# Patient Record
Sex: FEMALE | Race: BLACK OR AFRICAN AMERICAN | Employment: UNEMPLOYED | URBAN - METROPOLITAN AREA
[De-identification: names, ages, dates, MRNs, and addresses within clinical notes are randomized per-mention and may not be internally consistent; named-entity substitution may affect disease eponyms.]

---

## 2020-11-06 DIAGNOSIS — Z12.31 SCREENING MAMMOGRAM, ENCOUNTER FOR: Primary | ICD-10-CM

## 2020-11-16 DIAGNOSIS — Z12.31 SCREENING MAMMOGRAM, ENCOUNTER FOR: ICD-10-CM

## 2020-11-16 PROCEDURE — 77067 SCR MAMMO BI INCL CAD: CPT | Performed by: OBSTETRICS & GYNECOLOGY

## 2020-12-17 DIAGNOSIS — N92.0 MENORRHAGIA WITH REGULAR CYCLE: Primary | ICD-10-CM

## 2020-12-17 RX ORDER — NORGESTIMATE AND ETHINYL ESTRADIOL 0.25-0.035
1 KIT ORAL DAILY
Qty: 1 DOSE PACK | Refills: 0 | Status: SHIPPED | OUTPATIENT
Start: 2020-12-17 | End: 2021-11-16

## 2021-09-01 VITALS — HEIGHT: 63 IN

## 2021-09-01 PROBLEM — E66.01 MORBID OBESITY (HCC): Status: ACTIVE | Noted: 2021-09-01

## 2021-09-27 PROBLEM — N13.39 OTHER HYDRONEPHROSIS: Status: ACTIVE | Noted: 2021-09-27

## 2021-09-29 ENCOUNTER — OFFICE VISIT (OUTPATIENT)
Dept: UROLOGY | Age: 41
End: 2021-09-29
Payer: COMMERCIAL

## 2021-09-29 VITALS
HEART RATE: 136 BPM | DIASTOLIC BLOOD PRESSURE: 72 MMHG | OXYGEN SATURATION: 99 % | TEMPERATURE: 97.8 F | HEIGHT: 64 IN | SYSTOLIC BLOOD PRESSURE: 97 MMHG | WEIGHT: 214 LBS | BODY MASS INDEX: 36.54 KG/M2

## 2021-09-29 DIAGNOSIS — C76.2 ABDOMINAL MALIGNANCY (HCC): ICD-10-CM

## 2021-09-29 DIAGNOSIS — C49.9 SARCOMA (HCC): ICD-10-CM

## 2021-09-29 DIAGNOSIS — N13.39 OTHER HYDRONEPHROSIS: ICD-10-CM

## 2021-09-29 LAB
BILIRUB UR QL STRIP: NORMAL
GLUCOSE UR-MCNC: NEGATIVE MG/DL
KETONES P FAST UR STRIP-MCNC: NEGATIVE MG/DL
PH UR STRIP: 5.5 [PH] (ref 4.6–8)
PROT UR QL STRIP: NORMAL
SP GR UR STRIP: 1.02 (ref 1–1.03)
UA UROBILINOGEN AMB POC: NORMAL (ref 0.2–1)
URINALYSIS CLARITY POC: CLEAR
URINALYSIS COLOR POC: YELLOW
URINE BLOOD POC: NORMAL
URINE LEUKOCYTES POC: NEGATIVE
URINE NITRITES POC: NEGATIVE

## 2021-09-29 PROCEDURE — 81003 URINALYSIS AUTO W/O SCOPE: CPT | Performed by: UROLOGY

## 2021-09-29 PROCEDURE — 99204 OFFICE O/P NEW MOD 45 MIN: CPT | Performed by: UROLOGY

## 2021-09-29 RX ORDER — ERGOCALCIFEROL 1.25 MG/1
50000 CAPSULE ORAL
COMMUNITY
End: 2022-04-13 | Stop reason: CLARIF

## 2021-09-29 RX ORDER — IBUPROFEN 800 MG/1
TABLET ORAL
COMMUNITY
End: 2022-04-13 | Stop reason: CLARIF

## 2021-09-29 RX ORDER — CYPROHEPTADINE HYDROCHLORIDE 4 MG/1
TABLET ORAL
COMMUNITY

## 2021-09-29 RX ORDER — METHOTREXATE 2.5 MG/1
TABLET ORAL
COMMUNITY
End: 2021-10-12 | Stop reason: ALTCHOICE

## 2021-09-29 RX ORDER — ONDANSETRON HYDROCHLORIDE 8 MG/1
8 TABLET, FILM COATED ORAL
COMMUNITY

## 2021-09-29 RX ORDER — PREDNISONE 5 MG/1
TABLET ORAL
COMMUNITY
End: 2021-11-16

## 2021-09-29 RX ORDER — METOPROLOL TARTRATE 25 MG/1
TABLET, FILM COATED ORAL 2 TIMES DAILY
COMMUNITY

## 2021-09-29 RX ORDER — LANOLIN ALCOHOL/MO/W.PET/CERES
CREAM (GRAM) TOPICAL
COMMUNITY
End: 2022-04-13 | Stop reason: CLARIF

## 2021-09-29 RX ORDER — HYDROXYCHLOROQUINE SULFATE 200 MG/1
200 TABLET, FILM COATED ORAL DAILY
COMMUNITY
End: 2021-10-12 | Stop reason: ALTCHOICE

## 2021-09-29 RX ORDER — CYCLOBENZAPRINE HCL 10 MG
TABLET ORAL
COMMUNITY

## 2021-09-29 RX ORDER — FOLIC ACID 1 MG/1
TABLET ORAL DAILY
COMMUNITY

## 2021-09-29 RX ORDER — ROSUVASTATIN CALCIUM 10 MG/1
10 TABLET, COATED ORAL
COMMUNITY

## 2021-09-29 NOTE — PROGRESS NOTES
HISTORY OF PRESENT ILLNESS  Dwayne Barahona is a 35 y.o. female. Chief Complaint   Patient presents with    New Patient    Hydronephrosis     She is referred by Dr. Manav Orozco for hydronephrosis. She is a 38-year-old woman with rheumatoid arthritis. She has an anemia associated with methotrexate. She saw hematology for this. A CT scan was obtained 9/2/2021. She had multiple findings. There was a large central heterogeneous mass 24 x 17 x 13.6 cm, displacing multiple adjacent organs and causing right-sided hydronephrosis and hydroureter. Origin was not definite. There were multiple metastatic foci in the liver and spleen. There were obturator and inguinal lymph nodes identified. There was a 3.2 cm right ovarian mass. There were 3.6 x 3.2 cm left sacral bony lesions representing possible metastatic foci. There was associated pathologic fracture through the periosteum visualized. Chest CT revealed multiple nodules in lung, the largest being 11 mm in size. Her creatinine was reportedly normal.    She apparently had a IR guided biopsy ordered. 9/9/21 she had right eye problems. She was admitted for evaluation. MRI of the brain was clear. She had biopsy of her abdominal mass and was found to have sarcoma. She was discharged 9/19/21. She has right sided flank pain since her lumbar puncture while in the hospital.    She is voiding 4-5x per night. She does drink each time she voids. Her daytime frequency is reasonable. She does not have urgency. Chronic Conditions Addressed Today     1. Other hydronephrosis     Overview      Referred from [facility] for abdominal mass that is displacing adjacent organs and causing right-sided hydronephrosis and hydroureter on CT 9/2/21. Imaging not available. 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   Renal function is preserved and she is now symptomatic from the hydronephrosis. Due to the extent of the abdominal mass she needs a ureteral stent, especially if she will get nephrotoxic medications. We will schedule her tomorrow if it can be arranged          Relevant Medications     ergocalciferol (Vitamin D2) 1,250 mcg (50,000 unit) capsule     ferrous sulfate (Iron) 325 mg (65 mg iron) tablet     predniSONE (DELTASONE) 5 mg tablet    2. Abdominal malignancy ([facility] [address]     Current Assessment & Plan      She has a large abdominal mass with probable metastasis to sacrum, liver and lung. IR guided biopsy is planned. Relevant Medications     methotrexate (RHEUMATREX) 2.5 mg tablet     predniSONE (DELTASONE) 5 mg tablet     ondansetron hcl (Zofran) 8 mg tablet    3. Sarcoma [facility])     Current Assessment & Plan      She has stage IV sarcoma. She will discuss treatment with her oncologist after her PET scan          Relevant Medications     methotrexate (RHEUMATREX) 2.5 mg tablet     predniSONE (DELTASONE) 5 mg tablet     ondansetron hcl (Zofran) 8 mg tablet          Past Medical History:    PMHx (including negatives):  has a past medical history of Cancer ([facility] [address] and Morbid obesity ([facility] [address] (9/1/2021). PSurgHx:  has a past surgical history that includes hx wisdom teeth extraction. PSocHx:  reports that she has never smoked. She has never used smokeless tobacco. She reports previous alcohol use. She reports that she does not use drugs. Review of Systems   Constitutional: Positive for weight loss (34lbs). Eyes: Positive for double vision (wears an eye patch). Respiratory: Positive for shortness of breath (asthma). Gastrointestinal: Positive for nausea (occasional). Negative for constipation and diarrhea. Genitourinary: Negative for dysuria, frequency, hematuria and urgency. Musculoskeletal: Positive for back pain. Neurological: Negative for tingling and tremors. All other systems reviewed and are negative. Physical Exam  Vitals reviewed. Constitutional:       General: She is not in acute distress. Appearance: Normal appearance. She is obese. She is not ill-appearing, toxic-appearing or diaphoretic. HENT:      Head: Normocephalic and atraumatic. Mouth/Throat:      Mouth: Mucous membranes are moist.      Pharynx: Oropharynx is clear. Eyes:      Conjunctiva/sclera: Conjunctivae normal.      Comments: Right eye lateral displacement, weak movement   Cardiovascular:      Rate and Rhythm: Normal rate and regular rhythm. Pulmonary:      Effort: Pulmonary effort is normal. No respiratory distress. Breath sounds: Normal breath sounds. Abdominal:      General: Bowel sounds are normal.      Palpations: Abdomen is soft. There is mass (large right abdominal mass). Musculoskeletal:         General: No swelling or deformity. Normal range of motion. Cervical back: Normal range of motion. Lymphadenopathy:      Cervical: No cervical adenopathy. Upper Body:      Right upper body: No supraclavicular adenopathy. Left upper body: No supraclavicular adenopathy. Skin:     General: Skin is warm and dry. Neurological:      General: No focal deficit present. Mental Status: She is alert and oriented to person, place, and time. Psychiatric:         Mood and Affect: Mood normal.         Behavior: Behavior normal.       Allergies   Allergen Reactions    Phenergan [Promethazine] Nausea Only    Toradol [Ketorolac] Rash     nausea    Vicodin [Hydrocodone-Acetaminophen] Rash     nausea      Prior to Admission medications    Medication Sig Start Date End Date Taking? Authorizing Provider   folic acid (FOLVITE) 1 mg tablet Take  by mouth daily. Yes Provider, Historical   methotrexate (RHEUMATREX) 2.5 mg tablet Take  by mouth. Yes Provider, Historical   rosuvastatin (CRESTOR) 10 mg tablet Take 10 mg by mouth nightly.    Yes Provider, Historical   ergocalciferol (Vitamin D2) 1,250 mcg (50,000 unit) capsule Take 50,000 Units by mouth.   Yes Provider, Historical   hydrOXYchloroQUINE (PLAQUENIL) 200 mg tablet Take 200 mg by mouth daily. Yes Provider, Historical   cyclobenzaprine (FLEXERIL) 10 mg tablet Take  by mouth three (3) times daily as needed for Muscle Spasm(s). Yes Provider, Historical   ibuprofen (MOTRIN) 800 mg tablet Take  by mouth. Yes Provider, Historical   ferrous sulfate (Iron) 325 mg (65 mg iron) tablet Take  by mouth Daily (before breakfast). Yes Provider, Historical   multivitamin, tx-iron-ca-min (THERA-M w/ IRON) 9 mg iron-400 mcg tab tablet Take 1 Tablet by mouth daily. Yes Provider, Historical   predniSONE (DELTASONE) 5 mg tablet Take  by mouth. Yes Provider, Historical   metoprolol tartrate (LOPRESSOR) 25 mg tablet Take  by mouth two (2) times a day. Yes Provider, Historical   ondansetron hcl (Zofran) 8 mg tablet Take 8 mg by mouth every eight (8) hours as needed for Nausea or Vomiting. Yes Provider, Historical   cyproheptadine (PERIACTIN) 4 mg tablet Take  by mouth three (3) times daily as needed. Yes Provider, Historical   norgestimate-ethinyl estradioL (Sprintec, 28,) 0.25-35 mg-mcg tab Take 1 Tab by mouth daily. 12/17/20  Yes Ebony Quach MD        ASSESSMENT and PLAN  Diagnoses and all orders for this visit:    1. 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 Renal function is preserved and she is now symptomatic from the hydronephrosis. Due to the extent of the abdominal mass she needs a ureteral stent, especially if she will get nephrotoxic medications. We will schedule her tomorrow if it can be arranged      2. Abdominal malignancy ([address]  Assessment & Plan:  She has a large abdominal mass with probable metastasis to sacrum, liver and lung. IR guided biopsy is planned. 3. Sarcoma [facility])  Assessment & Plan:  She has stage IV sarcoma.   She will discuss treatment with her oncologist after her PET scan      Other orders  -     AMB POC URINALYSIS DIP STICK AUTO W/O MICRO         [facility], MD

## 2021-09-29 NOTE — PROGRESS NOTES
Chief Complaint   Patient presents with    New Patient    Hydronephrosis     1. Have you been to the ER, urgent care clinic since your last visit? Hospitalized since your last visit? Yes When: 09/09/2021  Where: [facility]    2. Have you seen or consulted any other health care providers outside of the [address] [place] [address] since your last visit? Include any pap smears or colon screening.  No   Visit Vitals  BP 97/72 (BP 1 Location: Right upper arm, BP Patient Position: Sitting, BP Cuff Size: Adult)   Pulse (!) 136   Temp 97.8 °F (36.6 °C) (Temporal)   Ht 5' 4\" (1.626 m)   Wt 214 lb (97.1 kg)   SpO2 99%   BMI 36.73 kg/m²

## 2021-09-29 NOTE — ASSESSMENT & PLAN NOTE
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 Renal function is preserved and she is now symptomatic from the hydronephrosis. Due to the extent of the abdominal mass she needs a ureteral stent, especially if she will get nephrotoxic medications.     We will schedule her tomorrow if it can be arranged

## 2021-09-29 NOTE — ASSESSMENT & PLAN NOTE
She has a large abdominal mass with probable metastasis to sacrum, liver and lung. IR guided biopsy is planned.

## 2021-09-29 NOTE — LETTER
9/86/[id number]    Patient: Magdy Jasso   YOB: 1980   Date of Visit: 9/29/2021     Solomon Clayton MD  [address]  [address] [id number]  Via Fax: [phone]     Robyn Ureña MD  [address] [facility] [address]  Via Fax: [phone]    Dear MD Robyn Walter MD,      Thank you for referring Ms. Car Mail to [facility] for evaluation. My notes for this consultation are attached. If you have questions, please do not hesitate to call me. I look forward to following your patient along with you.       Sincerely,    Layton Rubinstein, MD

## 2021-09-30 ENCOUNTER — OFFICE VISIT (OUTPATIENT)
Dept: UROLOGY | Age: 41
End: 2021-09-30

## 2021-10-05 PROBLEM — Z96.0 RETAINED URETERAL STENT: Status: ACTIVE | Noted: 2021-10-05

## 2021-10-05 NOTE — PROGRESS NOTES
HISTORY OF PRESENT ILLNESS  Kaela Hinson is a 35 y.o. female. Chief Complaint   Patient presents with    Follow-up    Hydronephrosis     She has metastatic abdominal sarcoma with right-sided hydronephrosis secondary to the abdominal mass. She is now status post cystoscopy, bilateral RPG, right ureteral stent placement on 9/30/2021. The right ureter had a serpentine course with marked medial deviation. The proximal ureter was S-shaped with moderate hydronephrosis and blunting. Due to the deviated course the ureter appeared very stretched out and greater than 28 cm from UPJ to ureteral orifice. She is here today in follow-up. She is not bothered by her stent. She did not sense it versus everything else going on in her stomach. She states she is to start chemotherapy    Chronic Conditions Addressed Today     1. Other hydronephrosis     Overview      Referred from [facility] for abdominal mass that is displacing adjacent organs and causing right-sided hydronephrosis and hydroureter on CT 9/2/21. Imaging not available. Creatinine 8/27/21 was 0.72    9/30/21: s/p cystoscopy, bilateral RPG, right ureteral stent placement. Normal left RPG. Ureter had a serpentine course with marked medial deviation on the right side. The proximal ureter was S-shaped and there was moderate hydronephrosis and blunting. Due to the deviated course the ureter appeared very stretched out and greater than 28 cm from the UPJ to the ureteral orifice. Current Assessment & Plan       She has right hydronephrosis secondary to extrinsic compression from a large abdominal sarcoma. She is status post ureteral stenting on 9/30/2021. We will plan on monitor renal function and follow-up in 6 months for consideration of stent change. Relevant Medications     magnesium 250 mg tab     Other Relevant Orders     METABOLIC PANEL, BASIC    2.  Sarcoma [facility])     Current Assessment & Plan      Unfortunately she has a large abdominal sarcoma, metastatic to multiple sites. Overall prognosis is poor. 3. Retained ureteral stent - Primary     Overview      She is status post cystoscopy, bilateral RPG, and right ureteral stent placement on 9/30/2021. Current Assessment & Plan      She is status post a right ureteral stent. Hopefully this will be renal protective if she faces nephrotoxic chemotherapy. Patient denies the symptoms of COVID-19 per routine screening guidelines. ROS    Past Medical History:  PMHx (including negatives):  has a past medical history of Cancer ([address] Hypercholesterolemia, and Morbid obesity ([address] (9/1/2021). PSurgHx:  has a past surgical history that includes hx wisdom teeth extraction; hx urological (09/30/2021); hx urological (Bilateral, 09/30/2021); and hx urological (Right, 09/30/2021). PSocHx:  reports that she has never smoked. She has never used smokeless tobacco. She reports previous alcohol use. She reports that she does not use drugs. Physical Exam    ASSESSMENT and PLAN  Diagnoses and all orders for this visit:    1. Retained ureteral stent  Assessment & Plan:  She is status post a right ureteral stent. Hopefully this will be renal protective if she faces nephrotoxic chemotherapy. 2. Other hydronephrosis  Assessment & Plan:   She has right hydronephrosis secondary to extrinsic compression from a large abdominal sarcoma. She is status post ureteral stenting on 9/30/2021. We will plan on monitor renal function and follow-up in 6 months for consideration of stent change. Orders:  -     METABOLIC PANEL, BASIC; Future    3. Sarcoma [facility])  Assessment & Plan:  Unfortunately she has a large abdominal sarcoma, metastatic to multiple sites. Overall prognosis is poor. Follow-up and Dispositions    · Return in about 6 months (around 4/12/2022).          Deanna Roman MD

## 2021-10-12 ENCOUNTER — OFFICE VISIT (OUTPATIENT)
Dept: UROLOGY | Age: 41
End: 2021-10-12
Payer: COMMERCIAL

## 2021-10-12 VITALS — BODY MASS INDEX: 33.8 KG/M2 | HEIGHT: 64 IN | WEIGHT: 198 LBS

## 2021-10-12 DIAGNOSIS — Z96.0 RETAINED URETERAL STENT: Primary | ICD-10-CM

## 2021-10-12 DIAGNOSIS — N13.39 OTHER HYDRONEPHROSIS: ICD-10-CM

## 2021-10-12 DIAGNOSIS — C49.9 SARCOMA (HCC): ICD-10-CM

## 2021-10-12 PROCEDURE — 99213 OFFICE O/P EST LOW 20 MIN: CPT | Performed by: UROLOGY

## 2021-10-12 RX ORDER — ZINC GLUCONATE 10 MG
LOZENGE ORAL
COMMUNITY
End: 2022-04-13 | Stop reason: CLARIF

## 2021-10-12 NOTE — PROGRESS NOTES
Chief Complaint   Patient presents with    Follow-up    Hydronephrosis     1. Have you been to the ER, urgent care clinic since your last visit? Hospitalized since your last visit? Yes Where: [facility]    2. Have you seen or consulted any other health care providers outside of the [address] [place] [address] since your last visit? Include any pap smears or colon screening.  No  Visit Vitals  Ht 5' 4\" (1.626 m)   Wt 198 lb (89.8 kg)   BMI 33.99 kg/m²

## 2021-10-12 NOTE — ASSESSMENT & PLAN NOTE
She has right hydronephrosis secondary to extrinsic compression from a large abdominal sarcoma. She is status post ureteral stenting on 9/30/2021. We will plan on monitor renal function and follow-up in 6 months for consideration of stent change.

## 2021-10-12 NOTE — LETTER
18/34/6587    Patient: Frances Neumann   YOB: 1980   Date of Visit: 10/12/2021     Shar Romero MD  [address]  [address] [address]  Via Fax: [phone]    Dear Shar Romero MD,      Thank you for referring Ms. Lisa Gustafson to [facility] for evaluation. My notes for this consultation are attached. If you have questions, please do not hesitate to call me. I look forward to following your patient along with you.       Sincerely,    Wendy Sierra MD

## 2021-10-12 NOTE — ASSESSMENT & PLAN NOTE
She is status post a right ureteral stent. Hopefully this will be renal protective if she faces nephrotoxic chemotherapy.

## 2021-10-12 NOTE — ASSESSMENT & PLAN NOTE
Unfortunately she has a large abdominal sarcoma, metastatic to multiple sites. Overall prognosis is poor.

## 2021-11-12 VITALS — HEIGHT: 63 IN | BODY MASS INDEX: 35.07 KG/M2

## 2021-11-16 ENCOUNTER — OFFICE VISIT (OUTPATIENT)
Dept: OBGYN CLINIC | Age: 41
End: 2021-11-16
Payer: COMMERCIAL

## 2021-11-16 VITALS
DIASTOLIC BLOOD PRESSURE: 74 MMHG | WEIGHT: 201.13 LBS | HEIGHT: 63 IN | SYSTOLIC BLOOD PRESSURE: 126 MMHG | BODY MASS INDEX: 35.64 KG/M2

## 2021-11-16 DIAGNOSIS — N76.6 VULVAR ULCERATION: ICD-10-CM

## 2021-11-16 DIAGNOSIS — Z12.4 SCREENING FOR MALIGNANT NEOPLASM OF CERVIX: Primary | ICD-10-CM

## 2021-11-16 DIAGNOSIS — Z01.419 ROUTINE GYNECOLOGICAL EXAMINATION: ICD-10-CM

## 2021-11-16 PROCEDURE — 99396 PREV VISIT EST AGE 40-64: CPT | Performed by: OBSTETRICS & GYNECOLOGY

## 2021-11-16 RX ORDER — VALACYCLOVIR HYDROCHLORIDE 500 MG/1
500 TABLET, FILM COATED ORAL 2 TIMES DAILY
Qty: 20 TABLET | Refills: 2 | Status: SHIPPED | OUTPATIENT
Start: 2021-11-16 | End: 2021-12-08

## 2021-11-16 RX ORDER — SENNOSIDES 25 MG/1
TABLET, FILM COATED ORAL
Qty: 45 G | Refills: 1 | Status: SHIPPED | OUTPATIENT
Start: 2021-11-16 | End: 2022-10-08

## 2021-11-16 NOTE — PROGRESS NOTES
Chief Complaint   Patient presents with    Annual Exam     Visit Vitals  /74 (BP 1 Location: Right arm, BP Patient Position: Sitting, BP Cuff Size: Small adult)   Ht 5' 3\" (1.6 m)   Wt 201 lb 2 oz (91.2 kg)   BMI 35.63 kg/m²

## 2021-11-16 NOTE — PROGRESS NOTES
Vipul John is a , 35 y.o. female   No LMP recorded. (Menstrual status: Other (see Comment)). She presents for her annual    She is having significant dx'ed with abdominal sarcoma- under going therapy- chemo for now, notes some dysuria recently. Menstrual status:  Cycles are menopausal.    Flow: absent. She does not have dysmenorrhea. Medical conditions:  Since her last annual GYN exam about ? years ago, she has not the following changes in her health history: none. Mammogram History:    CHRIS Results (most recent):  Results from Orders Only encounter on 20    CHRIS MAMMO BI SCREENING INCL CAD       DEXA Results (most recent):  No results found for this or any previous visit. Past Medical History:   Diagnosis Date    Cancer ([facility] [address]     carcoma     Hypercholesterolemia     Irregular periods     Morbid obesity ([facility] [address] 2021    Primary intra-abdominal sarcoma ([facility] [address] 2021     Past Surgical History:   Procedure Laterality Date    HX UROLOGICAL  2021    CYSTOSCOPY     HX UROLOGICAL Bilateral 2021    RETROGRADE PYELOGRAMS    HX UROLOGICAL Right 2021    URETERAL STENT PLACEMENT     HX WISDOM TEETH EXTRACTION         Prior to Admission medications    Medication Sig Start Date End Date Taking? Authorizing Provider   lidocaine 5 % topical cream Apply  to affected area two (2) times daily as needed for Pain. 21  Yes Irlanda Alicia MD   valACYclovir (VALTREX) 500 mg tablet Take 1 Tablet by mouth two (2) times a day for 30 days. 21 Yes Irlanda Alicia MD   magnesium 250 mg tab Take  by mouth. Yes Provider, Historical   folic acid (FOLVITE) 1 mg tablet Take  by mouth daily. Yes Provider, Historical   rosuvastatin (CRESTOR) 10 mg tablet Take 10 mg by mouth nightly. Yes Provider, Historical   ergocalciferol (Vitamin D2) 1,250 mcg (50,000 unit) capsule Take 50,000 Units by mouth.    Yes Provider, Historical   cyclobenzaprine (FLEXERIL) 10 mg tablet Take  by mouth three (3) times daily as needed for Muscle Spasm(s). Yes Provider, Historical   ibuprofen (MOTRIN) 800 mg tablet Take  by mouth. Yes Provider, Historical   ferrous sulfate (Iron) 325 mg (65 mg iron) tablet Take  by mouth Daily (before breakfast). Yes Provider, Historical   multivitamin, tx-iron-ca-min (THERA-M w/ IRON) 9 mg iron-400 mcg tab tablet Take 1 Tablet by mouth daily. Yes Provider, Historical   metoprolol tartrate (LOPRESSOR) 25 mg tablet Take  by mouth two (2) times a day. Yes Provider, Historical   ondansetron hcl (Zofran) 8 mg tablet Take 8 mg by mouth every eight (8) hours as needed for Nausea or Vomiting. Yes Provider, Historical   cyproheptadine (PERIACTIN) 4 mg tablet Take  by mouth three (3) times daily as needed. Yes Provider, Historical       Allergies   Allergen Reactions    Phenergan [Promethazine] Nausea Only    Toradol [Ketorolac] Rash     nausea    Vicodin [Hydrocodone-Acetaminophen] Rash     nausea          Tobacco History:  reports that she has never smoked. She has never used smokeless tobacco.  Alcohol Abuse:  reports previous alcohol use. Drug Abuse:  reports no history of drug use. Family Medical/Cancer History:   Family History   Problem Relation Age of Onset    Hypertension Mother     Other Mother         uterine fibroids    Heart Disease Father     Hypertension Father           Review of Systems   Constitutional: Negative for chills, fever, malaise/fatigue and weight loss. HENT: Negative for congestion, ear pain, sinus pain and tinnitus. Eyes: Negative for blurred vision and double vision. Respiratory: Negative for cough, shortness of breath and wheezing. Cardiovascular: Negative for chest pain and palpitations. Gastrointestinal: Negative for abdominal pain, blood in stool, constipation, diarrhea, heartburn, nausea and vomiting.    Genitourinary: Negative for dysuria, flank pain, frequency, hematuria and urgency. Musculoskeletal: Negative for joint pain and myalgias. Skin: Negative for itching and rash. Neurological: Negative for dizziness, weakness and headaches. Psychiatric/Behavioral: Negative for depression, memory loss and suicidal ideas. The patient is not nervous/anxious and does not have insomnia. Physical Exam  Constitutional:       Appearance: Normal appearance. HENT:      Head: Normocephalic and atraumatic. Cardiovascular:      Rate and Rhythm: Normal rate. Heart sounds: Normal heart sounds. Pulmonary:      Effort: Pulmonary effort is normal.      Breath sounds: Normal breath sounds. Chest:   Breasts:      Right: Normal.      Left: Normal.       Abdominal:      General: Abdomen is flat. Palpations: Abdomen is soft. Genitourinary:     General: Normal vulva. Vagina: Normal.      Cervix: Normal.      Uterus: Normal.       Adnexa: Right adnexa normal and left adnexa normal.      Rectum: Normal.          Comments: PAP Obtained  Neurological:      Mental Status: She is alert. Psychiatric:         Mood and Affect: Mood normal.         Behavior: Behavior normal.         Thought Content: Thought content normal.          Visit Vitals  /74 (BP 1 Location: Right arm, BP Patient Position: Sitting, BP Cuff Size: Small adult)   Ht 5' 3\" (1.6 m)   Wt 201 lb 2 oz (91.2 kg)   BMI 35.63 kg/m²         Assessment:   Diagnoses and all orders for this visit:    1. Screening for malignant neoplasm of cervix  -     PAP IG, RFX APTIMA HPV ASCUS ([id number])    2. Routine gynecological examination  -     PAP IG, RFX APTIMA HPV ASCUS ([id number])    3. Vulvar ulceration    Other orders  -     lidocaine 5 % topical cream; Apply  to affected area two (2) times daily as needed for Pain.  -     valACYclovir (VALTREX) 500 mg tablet; Take 1 Tablet by mouth two (2) times a day for 30 days.     Due for COVID booster- will get Mammo after the holidays    Plan: Questions addressed  Counseled re: diet, exercise, healthy lifestyle  Return for Annual  Rec annual mammogram        Follow-up and Dispositions    · Return for Mammogram, 1 yr annual, 1 yr mammo.

## 2021-11-29 LAB
CYTOLOGIST CVX/VAG CYTO: NORMAL
CYTOLOGY CVX/VAG DOC CYTO: NORMAL
CYTOLOGY CVX/VAG DOC THIN PREP: NORMAL
DX ICD CODE: NORMAL
LABCORP, 190119: NORMAL
Lab: NORMAL
OTHER STN SPEC: NORMAL
STAT OF ADQ CVX/VAG CYTO-IMP: NORMAL

## 2021-12-08 RX ORDER — VALACYCLOVIR HYDROCHLORIDE 500 MG/1
500 TABLET, FILM COATED ORAL 2 TIMES DAILY
Qty: 60 TABLET | Refills: 0 | Status: SHIPPED | OUTPATIENT
Start: 2021-12-08 | End: 2022-01-03

## 2022-01-03 RX ORDER — VALACYCLOVIR HYDROCHLORIDE 500 MG/1
500 TABLET, FILM COATED ORAL 2 TIMES DAILY
Qty: 60 TABLET | Refills: 0 | Status: SHIPPED | OUTPATIENT
Start: 2022-01-03 | End: 2022-02-02

## 2022-01-12 DIAGNOSIS — N13.39 OTHER HYDRONEPHROSIS: ICD-10-CM

## 2022-03-19 PROBLEM — E66.01 MORBID OBESITY (HCC): Status: ACTIVE | Noted: 2021-09-01

## 2022-03-19 PROBLEM — N76.6 VULVAR ULCERATION: Status: ACTIVE | Noted: 2021-11-16

## 2022-03-19 PROBLEM — N13.39 OTHER HYDRONEPHROSIS: Status: ACTIVE | Noted: 2021-09-27

## 2022-03-19 PROBLEM — Z96.0 RETAINED URETERAL STENT: Status: ACTIVE | Noted: 2021-10-05

## 2022-03-19 PROBLEM — C49.9 SARCOMA (HCC): Status: ACTIVE | Noted: 2021-09-29

## 2022-03-20 PROBLEM — C76.2 ABDOMINAL MALIGNANCY (HCC): Status: ACTIVE | Noted: 2021-09-29

## 2022-03-30 ENCOUNTER — TELEPHONE (OUTPATIENT)
Dept: OBGYN CLINIC | Age: 42
End: 2022-03-30

## 2022-03-30 DIAGNOSIS — A60.00 GENITAL HERPES SIMPLEX, UNSPECIFIED SITE: Primary | ICD-10-CM

## 2022-03-30 RX ORDER — VALACYCLOVIR HYDROCHLORIDE 500 MG/1
500 TABLET, FILM COATED ORAL 2 TIMES DAILY
Qty: 60 TABLET | Refills: 3 | Status: SHIPPED | OUTPATIENT
Start: 2022-03-30 | End: 2022-08-29

## 2022-04-12 ENCOUNTER — OFFICE VISIT (OUTPATIENT)
Dept: UROLOGY | Age: 42
End: 2022-04-12
Payer: COMMERCIAL

## 2022-04-12 VITALS
SYSTOLIC BLOOD PRESSURE: 102 MMHG | HEART RATE: 111 BPM | TEMPERATURE: 98.6 F | HEIGHT: 63 IN | WEIGHT: 227 LBS | BODY MASS INDEX: 40.22 KG/M2 | OXYGEN SATURATION: 99 % | DIASTOLIC BLOOD PRESSURE: 69 MMHG

## 2022-04-12 DIAGNOSIS — C49.9 SARCOMA (HCC): ICD-10-CM

## 2022-04-12 DIAGNOSIS — Z96.0 RETAINED URETERAL STENT: ICD-10-CM

## 2022-04-12 DIAGNOSIS — R82.81 PYURIA: ICD-10-CM

## 2022-04-12 DIAGNOSIS — N13.39 OTHER HYDRONEPHROSIS: Primary | ICD-10-CM

## 2022-04-12 DIAGNOSIS — C76.2 ABDOMINAL MALIGNANCY (HCC): ICD-10-CM

## 2022-04-12 LAB
BILIRUB UR QL: NEGATIVE
GLUCOSE UR-MCNC: NEGATIVE MG/DL
KETONES P FAST UR STRIP-MCNC: NEGATIVE MG/DL
PH UR STRIP: 7 [PH] (ref 4.6–8)
PROT UR QL STRIP: 30
SP GR UR STRIP: 1.01 (ref 1–1.03)
UA UROBILINOGEN AMB POC: NORMAL (ref 0.2–1)
URINALYSIS CLARITY POC: CLEAR
URINALYSIS COLOR POC: YELLOW
URINE BLOOD POC: NORMAL
URINE LEUKOCYTES POC: NORMAL
URINE NITRITES POC: NEGATIVE

## 2022-04-12 PROCEDURE — 81003 URINALYSIS AUTO W/O SCOPE: CPT | Performed by: UROLOGY

## 2022-04-12 PROCEDURE — 99214 OFFICE O/P EST MOD 30 MIN: CPT | Performed by: UROLOGY

## 2022-04-12 NOTE — PROGRESS NOTES
HISTORY OF PRESENT ILLNESS  Huy Sheth is a 38 y.o. female. Chief Complaint   Patient presents with    Follow-up    Hydronephrosis     Past Medical History:  PMHx (including negatives):  has a past medical history of Cancer ([address] Hypercholesterolemia, Irregular periods, Morbid obesity ([address] (9/1/2021), and Primary intra-abdominal sarcoma ([facility] [address] (08/01/2021). PSurgHx:  has a past surgical history that includes hx wisdom teeth extraction; hx urological (09/30/2021); hx urological (Bilateral, 09/30/2021); and hx urological (Right, 09/30/2021). PSocHx:  reports that she has never smoked. She has never used smokeless tobacco. She reports previous alcohol use. She reports that she does not use drugs. She is s/p ureteral stent placement on 9/30/2021 secondary to hydronephrosis (from abdominal mass/sarcoma). She is here today to discuss stent exchange. She feels improved. She is on chemotherapy. Her tumor has regressed. Chronic Conditions Addressed Today     1. Other hydronephrosis - Primary     Overview      Referred from I for abdominal mass that is displacing adjacent organs and causing right-sided hydronephrosis and hydroureter on CT 9/2/21. Imaging not available. Creatinine 8/27/21 was 0.72    9/30/21: s/p cystoscopy, bilateral RPG, right ureteral stent placement. Normal left RPG. Ureter had a serpentine course with marked medial deviation on the right side. The proximal ureter was S-shaped and there was moderate hydronephrosis and blunting. Due to the deviated course the ureter appeared very stretched out and greater than 28 cm from the UPJ to the ureteral orifice. 2. 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 Overall, prognosis is poor. 3. Retained ureteral stent     Overview      She is status post cystoscopy, bilateral RPG, and right ureteral stent placement on 9/30/2021.                     Review of Systems All other systems reviewed and are negative. Patient denies the symptoms of COVID-19 per routine screening guidelines. Physical Exam  Vitals reviewed. Constitutional:       General: She is not in acute distress. Appearance: Normal appearance. She is obese. She is not ill-appearing, toxic-appearing or diaphoretic. HENT:      Head: Normocephalic and atraumatic. Nose: Nose normal.   Eyes:      Conjunctiva/sclera: Conjunctivae normal.      Pupils: Pupils are equal, round, and reactive to light. Cardiovascular:      Rate and Rhythm: Normal rate and regular rhythm. Pulmonary:      Effort: Pulmonary effort is normal. No respiratory distress. Breath sounds: Normal breath sounds. Musculoskeletal:      Cervical back: Normal range of motion. Neurological:      General: No focal deficit present. Mental Status: She is alert and oriented to person, place, and time. Psychiatric:         Mood and Affect: Mood normal.         ASSESSMENT and PLAN  Diagnoses and all orders for this visit:    1. Other hydronephrosis  Assessment & Plan:   Bilateral ureteral obstruction secondary to sarcoma. Her stents had been in over 6 months. She is due for a change. We will schedule this in the future    Orders:  -     AMB POC URINALYSIS DIP STICK AUTO W/O MICRO    2. Retained ureteral stent  Assessment & Plan:  Ureteral stents were placed September 30, 2021. She is due for a change of    Orders:  -     AMB POC URINALYSIS DIP STICK AUTO W/O MICRO    3. Sarcoma ([address]    4. Abdominal malignancy [facility])  Assessment & Plan:  She has metastatic sarcoma. Guarded overall prognosis      5. Pyuria  -     CULTURE, URINE  -     URINALYSIS W/MICROSCOPIC         Follow-up and Dispositions    · Return for Surgery to be scheduled. Laurie Kidd may have a reminder for a \"due or due soon\" health maintenance.  The patient has been encouraged to contact their primary care provider for follow-up on this health maintenance or other necessary and/or routine health screening.      Van Villagran MD

## 2022-04-12 NOTE — PROGRESS NOTES
Chief Complaint   Patient presents with    Follow-up    Hydronephrosis       PHQ-9 score is    Negative    Vitals:    04/12/22 1309   BP: 102/69   Pulse: (!) 111   Temp: 98.6 °F (37 °C)   TempSrc: Temporal   SpO2: 99%   Weight: 227 lb (103 kg)   Height: 5' 3\" (1.6 m)   PainSc:   0 - No pain      1. \"Have you been to the ER, urgent care clinic since your last visit? Hospitalized since your last visit? \" No    2. \"Have you seen or consulted any other health care providers outside of the [address] [place] [address] since your last visit? \" No     3. For patients aged 39-70: Has the patient had a colonoscopy / FIT/ Cologuard? No      If the patient is female:    4. For patients aged 41-77: Has the patient had a mammogram within the past 2 years? No      5. For patients aged 21-65: Has the patient had a pap smear?  No

## 2022-04-12 NOTE — ASSESSMENT & PLAN NOTE
Bilateral ureteral obstruction secondary to sarcoma. Her stents had been in over 6 months. She is due for a change.   We will schedule this in the future

## 2022-04-12 NOTE — LETTER
7/75/5376    Patient: Wolfgang Garnica   YOB: 1980   Date of Visit: 4/12/2022     Tere Lewis MD  [address]  [address] [facility] [address]  Via Fax: [phone]     Augusta Davis MD  [facility] [place] [address]  Via Fax: [phone]    Dear MD Augusta Morton MD,      Thank you for referring Ms. Shruthi Dunlap to [place] [address] for evaluation. My notes for this consultation are attached. If you have questions, please do not hesitate to call me. I look forward to following your patient along with you.       Sincerely,    Yao Torres MD

## 2022-04-13 RX ORDER — CALCIUM CARBONATE 600 MG
1 TABLET ORAL DAILY
COMMUNITY
Start: 2022-03-24 | End: 2022-06-22

## 2022-04-13 RX ORDER — FUROSEMIDE 40 MG/1
TABLET ORAL
COMMUNITY
Start: 2022-04-07

## 2022-04-13 RX ORDER — ALBUTEROL SULFATE 90 UG/1
AEROSOL, METERED RESPIRATORY (INHALATION)
COMMUNITY
Start: 2022-03-31

## 2022-04-15 LAB
APPEARANCE UR: CLEAR
BACTERIA #/AREA URNS HPF: ABNORMAL /[HPF]
BACTERIA UR CULT: NORMAL
BILIRUB UR QL STRIP: NEGATIVE
CASTS URNS QL MICRO: ABNORMAL /LPF
COLOR UR: YELLOW
EPI CELLS #/AREA URNS HPF: ABNORMAL /HPF (ref 0–10)
GLUCOSE UR QL STRIP: NEGATIVE
HGB UR QL STRIP: ABNORMAL
KETONES UR QL STRIP: NEGATIVE
LEUKOCYTE ESTERASE UR QL STRIP: ABNORMAL
MICRO URNS: ABNORMAL
NITRITE UR QL STRIP: NEGATIVE
PH UR STRIP: 7.5 [PH] (ref 5–7.5)
PROT UR QL STRIP: ABNORMAL
RBC #/AREA URNS HPF: ABNORMAL /HPF (ref 0–2)
SP GR UR STRIP: 1.02 (ref 1–1.03)
UROBILINOGEN UR STRIP-MCNC: 1 MG/DL (ref 0.2–1)
WBC #/AREA URNS HPF: ABNORMAL /HPF (ref 0–5)

## 2022-04-21 ENCOUNTER — ANESTHESIA EVENT (OUTPATIENT)
Dept: SURGERY | Age: 42
End: 2022-04-21
Payer: COMMERCIAL

## 2022-04-21 ENCOUNTER — HOSPITAL ENCOUNTER (OUTPATIENT)
Age: 42
Discharge: HOME | End: 2022-04-21
Attending: UROLOGY | Admitting: UROLOGY
Payer: COMMERCIAL

## 2022-04-21 ENCOUNTER — APPOINTMENT (OUTPATIENT)
Dept: GENERAL RADIOLOGY | Age: 42
End: 2022-04-21
Attending: UROLOGY
Payer: COMMERCIAL

## 2022-04-21 ENCOUNTER — ANESTHESIA (OUTPATIENT)
Dept: SURGERY | Age: 42
End: 2022-04-21
Payer: COMMERCIAL

## 2022-04-21 VITALS
OXYGEN SATURATION: 100 % | HEIGHT: 64 IN | TEMPERATURE: 97.8 F | RESPIRATION RATE: 16 BRPM | HEART RATE: 73 BPM | DIASTOLIC BLOOD PRESSURE: 58 MMHG | WEIGHT: 224 LBS | SYSTOLIC BLOOD PRESSURE: 101 MMHG | BODY MASS INDEX: 38.24 KG/M2

## 2022-04-21 LAB
GLUCOSE BLD STRIP.AUTO-MCNC: 107 MG/DL (ref 65–117)
PERFORMED BY, TECHID: NORMAL

## 2022-04-21 PROCEDURE — 74011250636 HC RX REV CODE- 250/636: Performed by: NURSE ANESTHETIST, CERTIFIED REGISTERED

## 2022-04-21 PROCEDURE — C2617 STENT, NON-COR, TEM W/O DEL: HCPCS | Performed by: UROLOGY

## 2022-04-21 PROCEDURE — 74420 UROGRAPHY RTRGR +-KUB: CPT | Performed by: UROLOGY

## 2022-04-21 PROCEDURE — 82962 GLUCOSE BLOOD TEST: CPT

## 2022-04-21 PROCEDURE — 76210000021 HC REC RM PH II 0.5 TO 1 HR: Performed by: UROLOGY

## 2022-04-21 PROCEDURE — 76060000032 HC ANESTHESIA 0.5 TO 1 HR: Performed by: UROLOGY

## 2022-04-21 PROCEDURE — 52332 CYSTOSCOPY AND TREATMENT: CPT | Performed by: UROLOGY

## 2022-04-21 PROCEDURE — 76000 FLUOROSCOPY <1 HR PHYS/QHP: CPT

## 2022-04-21 PROCEDURE — C1758 CATHETER, URETERAL: HCPCS | Performed by: UROLOGY

## 2022-04-21 PROCEDURE — 74011000250 HC RX REV CODE- 250: Performed by: NURSE ANESTHETIST, CERTIFIED REGISTERED

## 2022-04-21 PROCEDURE — 2709999900 HC NON-CHARGEABLE SUPPLY: Performed by: UROLOGY

## 2022-04-21 PROCEDURE — 76010000138 HC OR TIME 0.5 TO 1 HR: Performed by: UROLOGY

## 2022-04-21 PROCEDURE — 74011000636 HC RX REV CODE- 636: Performed by: UROLOGY

## 2022-04-21 PROCEDURE — 76210000006 HC OR PH I REC 0.5 TO 1 HR: Performed by: UROLOGY

## 2022-04-21 PROCEDURE — C1769 GUIDE WIRE: HCPCS | Performed by: UROLOGY

## 2022-04-21 DEVICE — VARIABLE LENGTH URETERAL STENT SET
Type: IMPLANTABLE DEVICE | Site: URETER | Status: FUNCTIONAL
Brand: CONTOUR VL™

## 2022-04-21 RX ORDER — ALBUTEROL SULFATE 0.83 MG/ML
2.5 SOLUTION RESPIRATORY (INHALATION) AS NEEDED
Status: DISCONTINUED | OUTPATIENT
Start: 2022-04-21 | End: 2022-04-21 | Stop reason: HOSPADM

## 2022-04-21 RX ORDER — PROPOFOL 10 MG/ML
INJECTION, EMULSION INTRAVENOUS AS NEEDED
Status: DISCONTINUED | OUTPATIENT
Start: 2022-04-21 | End: 2022-04-21 | Stop reason: HOSPADM

## 2022-04-21 RX ORDER — SODIUM CHLORIDE, SODIUM LACTATE, POTASSIUM CHLORIDE, CALCIUM CHLORIDE 600; 310; 30; 20 MG/100ML; MG/100ML; MG/100ML; MG/100ML
20 INJECTION, SOLUTION INTRAVENOUS CONTINUOUS
Status: DISCONTINUED | OUTPATIENT
Start: 2022-04-21 | End: 2022-04-21 | Stop reason: HOSPADM

## 2022-04-21 RX ORDER — FENTANYL CITRATE 50 UG/ML
50 INJECTION, SOLUTION INTRAMUSCULAR; INTRAVENOUS
Status: DISCONTINUED | OUTPATIENT
Start: 2022-04-21 | End: 2022-04-21 | Stop reason: HOSPADM

## 2022-04-21 RX ORDER — HYDROMORPHONE HYDROCHLORIDE 1 MG/ML
0.5 INJECTION, SOLUTION INTRAMUSCULAR; INTRAVENOUS; SUBCUTANEOUS
Status: DISCONTINUED | OUTPATIENT
Start: 2022-04-21 | End: 2022-04-21 | Stop reason: HOSPADM

## 2022-04-21 RX ORDER — MIDAZOLAM HYDROCHLORIDE 1 MG/ML
INJECTION, SOLUTION INTRAMUSCULAR; INTRAVENOUS AS NEEDED
Status: DISCONTINUED | OUTPATIENT
Start: 2022-04-21 | End: 2022-04-21 | Stop reason: HOSPADM

## 2022-04-21 RX ORDER — SODIUM CHLORIDE 0.9 % (FLUSH) 0.9 %
5-40 SYRINGE (ML) INJECTION AS NEEDED
Status: DISCONTINUED | OUTPATIENT
Start: 2022-04-21 | End: 2022-04-21 | Stop reason: HOSPADM

## 2022-04-21 RX ORDER — CEFAZOLIN SODIUM 1 G/3ML
INJECTION, POWDER, FOR SOLUTION INTRAMUSCULAR; INTRAVENOUS AS NEEDED
Status: DISCONTINUED | OUTPATIENT
Start: 2022-04-21 | End: 2022-04-21 | Stop reason: HOSPADM

## 2022-04-21 RX ORDER — FENTANYL CITRATE 50 UG/ML
INJECTION, SOLUTION INTRAMUSCULAR; INTRAVENOUS AS NEEDED
Status: DISCONTINUED | OUTPATIENT
Start: 2022-04-21 | End: 2022-04-21 | Stop reason: HOSPADM

## 2022-04-21 RX ORDER — DEXAMETHASONE SODIUM PHOSPHATE 4 MG/ML
INJECTION, SOLUTION INTRA-ARTICULAR; INTRALESIONAL; INTRAMUSCULAR; INTRAVENOUS; SOFT TISSUE AS NEEDED
Status: DISCONTINUED | OUTPATIENT
Start: 2022-04-21 | End: 2022-04-21 | Stop reason: HOSPADM

## 2022-04-21 RX ORDER — SODIUM CHLORIDE, SODIUM LACTATE, POTASSIUM CHLORIDE, CALCIUM CHLORIDE 600; 310; 30; 20 MG/100ML; MG/100ML; MG/100ML; MG/100ML
INJECTION, SOLUTION INTRAVENOUS
Status: DISCONTINUED | OUTPATIENT
Start: 2022-04-21 | End: 2022-04-21 | Stop reason: HOSPADM

## 2022-04-21 RX ORDER — ONDANSETRON 2 MG/ML
INJECTION INTRAMUSCULAR; INTRAVENOUS AS NEEDED
Status: DISCONTINUED | OUTPATIENT
Start: 2022-04-21 | End: 2022-04-21 | Stop reason: HOSPADM

## 2022-04-21 RX ORDER — LIDOCAINE HYDROCHLORIDE 20 MG/ML
INJECTION, SOLUTION EPIDURAL; INFILTRATION; INTRACAUDAL; PERINEURAL AS NEEDED
Status: DISCONTINUED | OUTPATIENT
Start: 2022-04-21 | End: 2022-04-21 | Stop reason: HOSPADM

## 2022-04-21 RX ORDER — NORETHINDRONE AND ETHINYL ESTRADIOL 0.5-0.035
KIT ORAL AS NEEDED
Status: DISCONTINUED | OUTPATIENT
Start: 2022-04-21 | End: 2022-04-21 | Stop reason: HOSPADM

## 2022-04-21 RX ORDER — ONDANSETRON 2 MG/ML
4 INJECTION INTRAMUSCULAR; INTRAVENOUS AS NEEDED
Status: DISCONTINUED | OUTPATIENT
Start: 2022-04-21 | End: 2022-04-21 | Stop reason: HOSPADM

## 2022-04-21 RX ORDER — HYDROMORPHONE HYDROCHLORIDE 2 MG/1
2 TABLET ORAL
Status: DISCONTINUED | OUTPATIENT
Start: 2022-04-21 | End: 2022-04-21 | Stop reason: HOSPADM

## 2022-04-21 RX ADMIN — PHENYLEPHRINE HYDROCHLORIDE 100 MCG: 10 INJECTION INTRAVENOUS at 09:21

## 2022-04-21 RX ADMIN — CEFAZOLIN SODIUM 2 G: 1 INJECTION, POWDER, FOR SOLUTION INTRAMUSCULAR; INTRAVENOUS at 09:06

## 2022-04-21 RX ADMIN — SODIUM CHLORIDE, POTASSIUM CHLORIDE, SODIUM LACTATE AND CALCIUM CHLORIDE: 600; 310; 30; 20 INJECTION, SOLUTION INTRAVENOUS at 08:59

## 2022-04-21 RX ADMIN — FENTANYL CITRATE 25 MCG: 50 INJECTION, SOLUTION INTRAMUSCULAR; INTRAVENOUS at 09:20

## 2022-04-21 RX ADMIN — PROPOFOL 150 MG: 10 INJECTION, EMULSION INTRAVENOUS at 09:05

## 2022-04-21 RX ADMIN — FENTANYL CITRATE 25 MCG: 50 INJECTION, SOLUTION INTRAMUSCULAR; INTRAVENOUS at 09:18

## 2022-04-21 RX ADMIN — FENTANYL CITRATE 50 MCG: 50 INJECTION, SOLUTION INTRAMUSCULAR; INTRAVENOUS at 09:05

## 2022-04-21 RX ADMIN — LIDOCAINE HYDROCHLORIDE 100 MG: 20 INJECTION, SOLUTION EPIDURAL; INFILTRATION; INTRACAUDAL; PERINEURAL at 09:05

## 2022-04-21 RX ADMIN — PHENYLEPHRINE HYDROCHLORIDE 100 MCG: 10 INJECTION INTRAVENOUS at 09:13

## 2022-04-21 RX ADMIN — DEXAMETHASONE SODIUM PHOSPHATE 4 MG: 4 INJECTION, SOLUTION INTRA-ARTICULAR; INTRALESIONAL; INTRAMUSCULAR; INTRAVENOUS; SOFT TISSUE at 09:12

## 2022-04-21 RX ADMIN — ONDANSETRON 4 MG: 2 INJECTION INTRAMUSCULAR; INTRAVENOUS at 09:41

## 2022-04-21 RX ADMIN — PHENYLEPHRINE HYDROCHLORIDE 100 MCG: 10 INJECTION INTRAVENOUS at 09:14

## 2022-04-21 RX ADMIN — MIDAZOLAM HYDROCHLORIDE 2 MG: 2 INJECTION, SOLUTION INTRAMUSCULAR; INTRAVENOUS at 08:59

## 2022-04-21 RX ADMIN — EPHEDRINE SULFATE 5 MG: 50 INJECTION INTRAVENOUS at 09:28

## 2022-04-21 NOTE — ANESTHESIA POSTPROCEDURE EVALUATION
Procedure(s):  CYSTOURTHEROSCOPY , RIGHT URETERAL STENT EXCHANGE. general    Anesthesia Post Evaluation      Multimodal analgesia: multimodal analgesia not used between 6 hours prior to anesthesia start to PACU discharge  Patient location during evaluation: PACU  Patient participation: complete - patient participated  Level of consciousness: awake  Pain score: 0  Pain management: adequate  Airway patency: patent  Anesthetic complications: no  Cardiovascular status: acceptable  Respiratory status: acceptable  Hydration status: acceptable  Post anesthesia nausea and vomiting:  controlled  Final Post Anesthesia Temperature Assessment:  Normothermia (36.0-37.5 degrees C)      INITIAL Post-op Vital signs: No vitals data found for the desired time range.

## 2022-04-21 NOTE — OP NOTES
UROLOGY OPERATIVE NOTE    Patient: Layne López MRN: [id number]  SSN: [id number]    YOB: 1980  Age: 38 y.o. Sex: female          Pre-operative Diagnosis: HYDRONEPHROSIS, RETAINED URETERAL STENT  Post-operative Diagnosis: HYDRONEPHROSIS, RETAINED URETERAL STENT  Procedure: Cystoscopy, retrograde pyelograms  RIGHT  ureteral stent exchange    Surgeon: Callie Arzate MD  Assistant: none    Anesthesia:  General  Findings:   Interpretation of right retrograde pyelogram: Distal ureter appeared patent. There was a proximal ureteral stricture, tight and several centimeters in length. The renal pelvis is mildly dilated. The calyces were blunted. The entire calyceal system was not opacified. Estimated Blood Loss:  scant  Drains: 7F x 22-30 variable length right ureteral stent  Specimens: none  Complications: none           Procedure Details: The patient was seen in the pre-operative area. The risks, benefits, complications, alternative treatment options, and expected outcomes were again discussed with the patient. The possibilities of reaction to medication, pain, infection, bleeding, major cardiovascular event, death, damage to surrounding structures were specifically addressed. Informed consent was obtained. Upon arrival to the operative suite, the patient, procedure, and side were confirmed via a pre-operative \"time-out\". All were in agreement. The patient was carefully positioned and anesthesia was undertaken. Sterile prep and drape was accomplished. The patient was in the lithotomy position. Using a 21 Fijian cystoscope with 30 and 70 degree lenses complete cystoscopy was performed. The urethra was unremarkable. The bladder mucosa was normal in appearance  without tumors, lesions or trabeculation seen. The ureteral orifices were seen on either side. There was a right ureteral stent coming on the orifice. Not redundant.      fluoroscopic imaging revealed the right ureteral stent. Using flexible graspers the right ureteral stent was grasped and withdrawn out the meatus. It was left partially in place and the cystoscope was reintroduced. Alongside the stent a 0.035 guidewire was passed up to the renal pelvis. The stent was then completely withdrawn. Alongside the wire a open-ended catheter is inserted and a gentle retrograde pyelogram was performed. There appeared to be patency of the distal ureter up to the proximal ureter. The proximal ureter was densely strictured and contrast did not pass with retrograde pressure. The catheter was advanced through the stricture into the renal pelvis area and further contrast was used to delineate the anatomy. The catheter was then removed. Over the wire a 7 [place] variable length stent was inserted. The proximal curl appeared to be within the renal pelvis and distal curl in the bladder on fluoroscopy and direct visualization. The bladder was drained. The scope was removed. The patient was transported in stable condition to recovery.      Martha Obrien MD

## 2022-04-21 NOTE — H&P
UROLOGY HISTORY AND PHYSICAL  [address]irk [address]AS[place], [address] [facility] Office    Patient: Edward Hernández MRN: [id number]  SSN: [id number]    YOB: 1980  Age: 38 y.o. Sex: female          Date of Encounter:  April 21, 2022  ADMITTED: 4/21/2022 to Stefefn Reyes MD by Denys Randle MD for Other hydronephrosis [N13.39]  Chief Complaint:  Right ureteral stent             History of Present Illness:  Patient is a 38 y.o. female admitted 4/21/2022 to the hospital for Other hydronephrosis [N13.39]. The patient is here for surgery. She has a h/o abdominal.  She has a right ureteral stent for hydronephrosis. She is due to stent change. See the problem list.     Problem List  Date Reviewed: 4/21/2022          Codes Class Noted    Vulvar ulceration ICD-10-CM: N76.6  ICD-9-CM: 616.50  11/16/2021        Retained ureteral stent ICD-10-CM: Z96.0  ICD-9-CM: V43.89  10/5/2021    Overview Signed 10/5/2021  3:10 PM by Blanca Wayne NP     She is status post cystoscopy, bilateral RPG, and right ureteral stent placement on 9/30/2021. Abdominal malignancy [facility]) ICD-10-CM: C76.2  ICD-9-CM: 195.2  9/29/2021    Overview Signed 4/6/2022  8:52 AM by Blanca Wayne NP     9/29/21: large abdominal mass with probable metastasis to sacrum, liver, and lung. Sarcoma 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 Overall, prognosis is poor. Other hydronephrosis ICD-10-CM: N13.39  ICD-9-CM: 323  9/27/2021    Overview Addendum 10/5/2021  3:13 PM by Blanca Wayne NP     Referred from [address] for abdominal mass that is displacing adjacent organs and causing right-sided hydronephrosis and hydroureter on CT 9/2/21. Imaging not available. Creatinine 8/27/21 was 0.72    9/30/21: s/p cystoscopy, bilateral RPG, right ureteral stent placement.   Normal left RPG.  Ureter had a serpentine course with marked medial deviation on the right side. The proximal ureter was S-shaped and there was moderate hydronephrosis and blunting. Due to the deviated course the ureter appeared very stretched out and greater than 28 cm from the UPJ to the ureteral orifice. Morbid obesity [facility]) ICD-10-CM: E66.01  ICD-9-CM: 278.01  9/1/2021               Past Medical History: Allergies   Allergen Reactions    Codeine Hives and Itching    Phenergan [Promethazine] Other (comments)     Patient denies and stated she currently takes the medication    Toradol [Ketorolac] Rash     nausea    Vicodin [Hydrocodone-Acetaminophen] Rash     nausea      Prior to Admission medications    Medication Sig Start Date End Date Taking? Authorizing Provider   furosemide (LASIX) 40 mg tablet TAKE 1 TABLET BY MOUTH 2 TIMES DAILY. MAY DECREASE TO 1 TABLET DAILY AS NEEDED. 4/7/22  Yes Provider, Historical   albuterol (PROVENTIL HFA, VENTOLIN HFA, PROAIR HFA) 90 mcg/actuation inhaler INHALE 2 PUFFS BY MOUTH EVERY 4-6 HOURS AS NEEDED FOR 90 DAYS 3/31/22  Yes Provider, Historical   calcium carbonate (CALTREX) 600 mg calcium (1,500 mg) tablet Take 1 Tablet by mouth daily. 3/24/22 6/22/22 Yes Provider, Historical   fluticasone propion/salmeterol ([facility]) Take 1 Puff by inhalation daily. Yes Provider, Historical   valACYclovir (VALTREX) 500 mg tablet Take 1 Tablet by mouth two (2) times a day. 3/30/22  Yes Saintclair Inks, MD   folic acid (FOLVITE) 1 mg tablet Take  by mouth daily. Yes Provider, Historical   rosuvastatin (CRESTOR) 10 mg tablet Take 10 mg by mouth nightly. Yes Provider, Historical   multivitamin, tx-iron-ca-min (THERA-M w/ IRON) 9 mg iron-400 mcg tab tablet Take 1 Tablet by mouth daily. Yes Provider, Historical   metoprolol tartrate (LOPRESSOR) 25 mg tablet Take  by mouth two (2) times a day.    Yes Provider, Historical   ondansetron hcl (Zofran) 8 mg tablet Take 8 mg by mouth every eight (8) hours as needed for Nausea or Vomiting. Yes Provider, Historical   lidocaine 5 % topical cream Apply  to affected area two (2) times daily as needed for Pain. 21   Chris Johansen MD   cyclobenzaprine (FLEXERIL) 10 mg tablet Take  by mouth three (3) times daily as needed for Muscle Spasm(s). Provider, Historical   cyproheptadine (PERIACTIN) 4 mg tablet Take  by mouth three (3) times daily as needed. Provider, Historical      PMHx:  has a past medical history of Asthma, Hypercholesterolemia, Ill-defined condition, Ill-defined condition, Irregular periods, Morbid obesity ([facility] (2021), and Primary intra-abdominal sarcoma ([facility] [address] (2021). PSurgHx:  has a past surgical history that includes hx wisdom teeth extraction; hx other surgical (Right, 2021); hx urological (2021); hx urological (Bilateral, 2021); and hx urological (Right, 2021). PSocHx:  reports that she has never smoked. She has never used smokeless tobacco. She reports previous alcohol use. She reports current drug use. Drug: Marijuana. ROS:  Admission ROS by Jade Cuellar MD from 2022 were reviewed with the patient and changes (other than per HPI) include: none. Physical Exam:            Vitals[de-identified]    Temp (24hrs), Av.8 °F (36.6 °C), Min:97.8 °F (36.6 °C), Max:97.8 °F (36.6 °C)   Blood pressure 104/71, pulse 91, temperature 97.8 °F (36.6 °C), resp. rate 18, height 5' 4\" (1.626 m), weight 224 lb (101.6 kg), SpO2 100 %. Estimated body mass index is 38.45 kg/m² as calculated from the following:    Height as of this encounter: 5' 4\" (1.626 m). Weight as of this encounter: 224 lb (101.6 kg). I&O's:    No intake/output data recorded.    General Well developed, in NAD   Conjunctiva/Lids Normal without gross defects   Neck Supple without obvious, masses   Respiratory Effort Breathing easily, no audible wheezing, rhonchi, stridor   CV RRR   Abdomen / Flank Soft, non tender without guarding or rebound, without obvious masses, no CVA tenderness   Neurologic Grossly normal without focal deficits           Assessment/Plan:  Right ureteral stent. Plan on cystoscopy, retrograde pyelograms, right ureteral stent exchange. The patient was counseled on the risks, benefits and expected course of surgery. Surgery has risks of bleeding, infection, injury, pain, death or other consequences. Perioperative medications and antibiotic use were discussed including the potential for reactions and side effects. Some specific risks of surgery were discussed as well.        Cystoscopy, retrograde pyelograms  RIGHT  ureteral stent exchange    Signed By: Noel Kendall MD  - April 21, 2022

## 2022-04-21 NOTE — ANESTHESIA PREPROCEDURE EVALUATION
Relevant Problems   RENAL FAILURE   (+) Other hydronephrosis      ENDOCRINE   (+) Morbid obesity (HCC)      PERSONAL HX & FAMILY HX OF CANCER   (+) Abdominal malignancy (HCC)   (+) Sarcoma (HCC)       Anesthetic History   No history of anesthetic complications            Review of Systems / Medical History  Patient summary reviewed, nursing notes reviewed and pertinent labs reviewed    Pulmonary            Asthma        Neuro/Psych   Within defined limits           Cardiovascular              Hyperlipidemia         GI/Hepatic/Renal  Within defined limits              Endo/Other        Morbid obesity     Other Findings   Comments: Procedure Information    Case: [id number] Date/Time: 04/21/22 0845  Procedure: CYSTOURTHEROSCOPY , RIGHT URETERAL STENT EXCHANGE (Right )  Anesthesia type: General  Pre-op diagnosis: HYDRONEPHROSIS, RETAINED URETERAL STENT  Location: [facility] MAIN OR  Surgeons: Court Puente MD      Medical History  Morbid obesity ([address]  Hypercholesterolemia  Irregular periods  Primary intra-abdominal sarcoma ([address]  Ill-defined condition  Ill-defined condition  Asthma             Physical Exam    Airway  Mallampati: III  TM Distance: 4 - 6 cm  Neck ROM: normal range of motion   Mouth opening: Normal     Cardiovascular    Rhythm: regular  Rate: normal         Dental  No notable dental hx       Pulmonary  Breath sounds clear to auscultation               Abdominal  GI exam deferred       Other Findings            Anesthetic Plan    ASA: 3  Anesthesia type: general          Induction: Intravenous  Anesthetic plan and risks discussed with: Patient and Family

## 2022-04-21 NOTE — PROGRESS NOTES
Discharge instructions given. Patient up to the bathroom to void without difficulty. Verbalizes understanding of information. Denies pain or discomfort.

## 2022-05-13 ENCOUNTER — TELEPHONE (OUTPATIENT)
Dept: UROLOGY | Age: 42
End: 2022-05-13

## 2022-05-26 NOTE — PROGRESS NOTES
HISTORY OF PRESENT ILLNESS  Lia Jacob is a 38 y.o. female. Chief Complaint   Patient presents with    Post OP Follow Up    Hydronephrosis     Past Medical History:  PMHx (including negatives):  has a past medical history of Asthma, Hypercholesterolemia, Ill-defined condition, Ill-defined condition, Irregular periods, Morbid obesity ([address] (9/1/2021), and Primary intra-abdominal sarcoma ([address] (08/01/2021). PSurgHx:  has a past surgical history that includes hx wisdom teeth extraction; hx other surgical (Right, 11/2021); hx urological (09/30/2021); hx urological (Bilateral, 09/30/2021); hx urological (Right, 09/30/2021); hx urological (04/21/2022); hx urological (04/21/2022); and hx urological (Right, 04/21/2022). PSocHx:  reports that she has never smoked. She has never used smokeless tobacco. She reports previous alcohol use. She reports current drug use. Drug: Marijuana. She is s/p RIGHT ureteral stent exchange on 4/21/22. She is here today for follow up. She tolerated the procedure well. No sx of UTI. Findings:   Interpretation of right retrograde pyelogram: Distal ureter appeared patent. There was a proximal ureteral stricture, tight and several centimeters in length. The renal pelvis is mildly dilated. The calyces were blunted. The entire calyceal system was not opacified. She has a large abdominal sarcoma. She is on chemo every other week. She sees oncology at [facility]. Overall, prognosis is guarded. Chronic Conditions Addressed Today     1. Abdominal malignancy ([facility]     Overview      9/29/21: large abdominal mass with probable metastasis to sacrum, liver, and lung. Current Assessment & Plan      She notes it is getting smaller. 2. 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 Overall, prognosis is guarded. She sees Dr. Lea Zavala at [facility].            Current Assessment & Plan      She is on Taxotere and Gemcytobine every other week. 3. Retained ureteral stent - Primary     Overview      She is status post cystoscopy, bilateral RPG, and right ureteral stent placement on 9/30/2021. She is s/p RIGHT stent exchange on 4/21/22. Current Assessment & Plan       She did well with ureteral stent change. Plan on change again in October. May be prudent to upsize to 8F next time. Relevant Orders     AMB POC URINALYSIS DIP STICK AUTO W/O MICRO      Acute Diagnoses Addressed Today     Pyuria        Inflammation in the urine, probably related to ureteral stent. Sent for culture and will treat appropriately. Relevant Orders        CULTURE, URINE        URINALYSIS W/MICROSCOPIC               ROS  Patient denies the symptoms of COVID-19 per routine screening guidelines. Physical Exam    ASSESSMENT and PLAN  Diagnoses and all orders for this visit:    1. Retained ureteral stent  Assessment & Plan:   She did well with ureteral stent change. Plan on change again in October. May be prudent to upsize to 8F next time. Orders:  -     AMB POC URINALYSIS DIP STICK AUTO W/O MICRO    2. Sarcoma [facility])  Assessment & Plan:  She is on Taxotere and Gemcytobine every other week. 3. Abdominal malignancy [facility])  Assessment & Plan:  She notes it is getting smaller. 4. Pyuria  Comments:  Inflammation in the urine, probably related to ureteral stent. Sent for culture and will treat appropriately. Orders:  -     CULTURE, URINE  -     URINALYSIS W/MICROSCOPIC         Follow-up and Dispositions    · Return in about 18 weeks (around 10/4/2022). Terry Crowe may have a reminder for a \"due or due soon\" health maintenance. The patient has been encouraged to contact their primary care provider for follow-up on this health maintenance or other necessary and/or routine health screening.      Martínez Meek MD

## 2022-05-31 ENCOUNTER — OFFICE VISIT (OUTPATIENT)
Dept: UROLOGY | Age: 42
End: 2022-05-31
Payer: COMMERCIAL

## 2022-05-31 VITALS
DIASTOLIC BLOOD PRESSURE: 72 MMHG | BODY MASS INDEX: 37.73 KG/M2 | WEIGHT: 221 LBS | HEIGHT: 64 IN | HEART RATE: 98 BPM | SYSTOLIC BLOOD PRESSURE: 124 MMHG

## 2022-05-31 DIAGNOSIS — C49.9 SARCOMA (HCC): ICD-10-CM

## 2022-05-31 DIAGNOSIS — R82.81 PYURIA: ICD-10-CM

## 2022-05-31 DIAGNOSIS — C76.2 ABDOMINAL MALIGNANCY (HCC): ICD-10-CM

## 2022-05-31 DIAGNOSIS — Z96.0 RETAINED URETERAL STENT: Primary | ICD-10-CM

## 2022-05-31 LAB
BILIRUB UR QL: NEGATIVE
GLUCOSE UR-MCNC: NEGATIVE MG/DL
KETONES P FAST UR STRIP-MCNC: NEGATIVE MG/DL
PH UR STRIP: 6 [PH] (ref 4.6–8)
PROT UR QL STRIP: NEGATIVE
SP GR UR STRIP: 1.02 (ref 1–1.03)
UA UROBILINOGEN AMB POC: NORMAL (ref 0.2–1)
URINALYSIS CLARITY POC: CLEAR
URINALYSIS COLOR POC: YELLOW
URINE BLOOD POC: NORMAL
URINE LEUKOCYTES POC: NORMAL
URINE NITRITES POC: NEGATIVE

## 2022-05-31 PROCEDURE — 99214 OFFICE O/P EST MOD 30 MIN: CPT | Performed by: UROLOGY

## 2022-05-31 PROCEDURE — 81003 URINALYSIS AUTO W/O SCOPE: CPT | Performed by: UROLOGY

## 2022-05-31 NOTE — PROGRESS NOTES
Chief Complaint   Patient presents with    Post OP Follow Up    Hydronephrosis     1. Have you been to the ER, urgent care clinic since your last visit? Hospitalized since your last visit? No    2. Have you seen or consulted any other health care providers outside of the [address] [place] [address] since your last visit? Include any pap smears or colon screening.  No  Visit Vitals  /72 (BP 1 Location: Right upper arm, BP Patient Position: Sitting, BP Cuff Size: Adult)   Pulse 98   Ht 5' 4\" (1.626 m)   Wt 221 lb (100.2 kg)   BMI 37.93 kg/m²

## 2022-05-31 NOTE — LETTER
9/44/6695    Patient: Hermelinda Proctor   YOB: 1980   Date of Visit: 5/31/2022     Larry Wright MD  [address]  [address] [facility] [address]  Via Fax: [phone]     Octavio Toledo MD  [facility] [place] [address]  Via Fax: [phone]    Dear MD Octavio Conrad MD,      Thank you for referring Ms. Myranda Melendez to [place] [address] for evaluation. My notes for this consultation are attached. If you have questions, please do not hesitate to call me. I look forward to following your patient along with you.       Sincerely,    Noel Kendall MD

## 2022-05-31 NOTE — ASSESSMENT & PLAN NOTE
She did well with ureteral stent change. Plan on change again in October. May be prudent to upsize to 8F next time.

## 2022-06-02 LAB
APPEARANCE UR: CLEAR
BACTERIA #/AREA URNS HPF: NORMAL /[HPF]
BACTERIA UR CULT: NORMAL
BILIRUB UR QL STRIP: NEGATIVE
CASTS URNS QL MICRO: NORMAL /LPF
COLOR UR: YELLOW
EPI CELLS #/AREA URNS HPF: NORMAL /HPF (ref 0–10)
GLUCOSE UR QL STRIP: NEGATIVE
HGB UR QL STRIP: NEGATIVE
KETONES UR QL STRIP: NEGATIVE
LEUKOCYTE ESTERASE UR QL STRIP: NEGATIVE
MICRO URNS: NORMAL
MICRO URNS: NORMAL
NITRITE UR QL STRIP: NEGATIVE
PH UR STRIP: 6 [PH] (ref 5–7.5)
PROT UR QL STRIP: NORMAL
RBC #/AREA URNS HPF: NORMAL /HPF (ref 0–2)
SP GR UR STRIP: 1.02 (ref 1–1.03)
UROBILINOGEN UR STRIP-MCNC: 0.2 MG/DL (ref 0.2–1)
WBC #/AREA URNS HPF: NORMAL /HPF (ref 0–5)

## 2022-09-29 NOTE — PROGRESS NOTES
HISTORY OF PRESENT ILLNESS  Tatum Wolfe is a 38 y.o. female. Chief Complaint   Patient presents with    Follow-up    Hydronephrosis     Past Medical History:  PMHx (including negatives):  has a past medical history of Asthma, Hypercholesterolemia, Ill-defined condition, Ill-defined condition, Irregular periods, Morbid obesity ([address] (9/1/2021), and Primary intra-abdominal sarcoma ([address] (08/01/2021). PSurgHx:  has a past surgical history that includes hx wisdom teeth extraction; hx other surgical (Right, 11/2021); hx urological (09/30/2021); hx urological (Bilateral, 09/30/2021); hx urological (Right, 09/30/2021); hx urological (04/21/2022); hx urological (04/21/2022); and hx urological (Right, 04/21/2022). PSocHx:  reports that she has never smoked. She has never used smokeless tobacco. She reports that she does not currently use alcohol. She reports current drug use. Drug: Marijuana. She is due for stent exchange. Last exchanged 4/21/22. She has a large abdominal sarcoma, metastatic. She is followed at [facility]. She is on chemotherapy and says she has responded. She has follow up imaging. She has lymphedema managing with a compression device. Chronic Conditions Addressed Today       1. Other hydronephrosis     Overview      Referred from I for abdominal mass that is displacing adjacent organs and causing right-sided hydronephrosis and hydroureter on CT 9/2/21. Imaging not available. Creatinine 8/27/21 was 0.72    9/30/21: s/p cystoscopy, bilateral RPG, right ureteral stent placement. Normal left RPG. Ureter had a serpentine course with marked medial deviation on the right side. The proximal ureter was S-shaped and there was moderate hydronephrosis and blunting. Due to the deviated course the ureter appeared very stretched out and greater than 28 cm from the UPJ to the ureteral orifice. Current Assessment & Plan       Right hydro s/p ureteral stent, for sarcoma.   Renal function stable. CBC ok. Ref Range & Units 7/26/22 1350   Sodium 135 - 145 mmol/L 136    Potassium 3.6 - 5.1 mmol/L 3.6    Potassium Comment Not Hemolyzed Not Hemolyzed    Chloride 100 - 110 mmol/L 100    Carbon Dioxide 21 - 33 mmol/L 31    Anion Gap 0 - 12 mmol/L 5    Glucose 65 - 100 mg/dL 77    BUN 8 - 23 mg/dL 14    Creatinine 0.50 - 1.00 mg/dL 0.83    GFRcr (Estimated) >=60 mL/min/1.73m2 89                Relevant Orders     AMB POC URINALYSIS DIP STICK AUTO W/O MICRO (Completed)     URINALYSIS W/MICROSCOPIC (Completed)    2. 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 She sees Dr. Sukhwinder Schwartz at [facility]. Current Assessment & Plan      On treatment. Controlled at this time. 3. Retained ureteral stent     Overview      She is status post cystoscopy, bilateral RPG, and right ureteral stent placement on 9/30/2021. She is s/p RIGHT stent exchange on 4/21/22. Current Assessment & Plan       She is due for a stent change. The patient was counseled on the risks, benefits and expected course of surgery. Surgical risk factors include concurrent diagnoses and PMH. Surgery has risks of bleeding, infection, injury, pain, death or other consequences. Perioperative medications and antibiotic use were discussed including the potential for reactions and side effects. Some specific risks of surgery were discussed as well. She wishes to proceed. Relevant Orders     AMB POC URINALYSIS DIP STICK AUTO W/O MICRO (Completed)     URINALYSIS W/MICROSCOPIC (Completed)            Review of Systems   All other systems reviewed and are negative. Patient denies the symptoms of COVID-19 per routine screening guidelines. Physical Exam  Vitals reviewed. Constitutional:       General: She is not in acute distress. Appearance: Normal appearance. She is obese. She is not ill-appearing, toxic-appearing or diaphoretic.    HENT: Head: Normocephalic and atraumatic. Nose: Nose normal.   Eyes:      Conjunctiva/sclera: Conjunctivae normal.      Pupils: Pupils are equal, round, and reactive to light. Pulmonary:      Effort: Pulmonary effort is normal. No respiratory distress. Breath sounds: Normal breath sounds. Musculoskeletal:      Cervical back: Normal range of motion. Neurological:      General: No focal deficit present. Mental Status: She is alert and oriented to person, place, and time. Psychiatric:         Mood and Affect: Mood normal.       ASSESSMENT and PLAN  Diagnoses and all orders for this visit:    1. [facility])  Assessment & Plan: On treatment. Controlled at this time. 2. Retained ureteral stent  Assessment & Plan:   She is due for a stent change. The patient was counseled on the risks, benefits and expected course of surgery. Surgical risk factors include concurrent diagnoses and PMH. Surgery has risks of bleeding, infection, injury, pain, death or other consequences. Perioperative medications and antibiotic use were discussed including the potential for reactions and side effects. Some specific risks of surgery were discussed as well. She wishes to proceed. Orders:  -     AMB POC URINALYSIS DIP STICK AUTO W/O MICRO  -     URINALYSIS W/MICROSCOPIC    3. Other hydronephrosis  Assessment & Plan:   Right hydro s/p ureteral stent, for sarcoma. Renal function stable. CBC ok.       Ref Range & Units 7/26/22 1350   Sodium 135 - 145 mmol/L 136    Potassium 3.6 - 5.1 mmol/L 3.6    Potassium Comment Not Hemolyzed Not Hemolyzed    Chloride 100 - 110 mmol/L 100    Carbon Dioxide 21 - 33 mmol/L 31    Anion Gap 0 - 12 mmol/L 5    Glucose 65 - 100 mg/dL 77    BUN 8 - 23 mg/dL 14    Creatinine 0.50 - 1.00 mg/dL 0.83    GFRcr (Estimated) >=60 mL/min/1.73m2 89          Orders:  -     AMB POC URINALYSIS DIP STICK AUTO W/O MICRO  -     URINALYSIS W/MICROSCOPIC    Other orders  -     MICROSCOPIC EXAMINATION         Follow-up and Dispositions    Return for Surgery to be scheduled. Pratibha Ayala may have a reminder for a \"due or due soon\" health maintenance. The patient has been encouraged to contact their primary care provider for follow-up on this health maintenance or other necessary and/or routine health screening.      Camilo Pederson MD

## 2022-10-07 ENCOUNTER — OFFICE VISIT (OUTPATIENT)
Dept: UROLOGY | Age: 42
End: 2022-10-07
Payer: COMMERCIAL

## 2022-10-07 VITALS
SYSTOLIC BLOOD PRESSURE: 131 MMHG | BODY MASS INDEX: 35.34 KG/M2 | HEART RATE: 76 BPM | HEIGHT: 64 IN | DIASTOLIC BLOOD PRESSURE: 83 MMHG | WEIGHT: 207 LBS

## 2022-10-07 DIAGNOSIS — C49.9 SARCOMA (HCC): Primary | ICD-10-CM

## 2022-10-07 DIAGNOSIS — Z96.0 RETAINED URETERAL STENT: ICD-10-CM

## 2022-10-07 DIAGNOSIS — N13.39 OTHER HYDRONEPHROSIS: ICD-10-CM

## 2022-10-07 LAB
BILIRUB UR QL: NEGATIVE
GLUCOSE UR-MCNC: NEGATIVE MG/DL
KETONES P FAST UR STRIP-MCNC: NEGATIVE MG/DL
PH UR STRIP: 6.5 [PH] (ref 4.6–8)
PROT UR QL STRIP: NORMAL
SP GR UR STRIP: 1.02 (ref 1–1.03)
UA UROBILINOGEN AMB POC: NORMAL (ref 0.2–1)
URINALYSIS CLARITY POC: CLEAR
URINALYSIS COLOR POC: YELLOW
URINE BLOOD POC: NORMAL
URINE LEUKOCYTES POC: NEGATIVE
URINE NITRITES POC: NEGATIVE

## 2022-10-07 PROCEDURE — 99214 OFFICE O/P EST MOD 30 MIN: CPT | Performed by: UROLOGY

## 2022-10-07 PROCEDURE — 81003 URINALYSIS AUTO W/O SCOPE: CPT | Performed by: UROLOGY

## 2022-10-07 NOTE — ASSESSMENT & PLAN NOTE
Right hydro s/p ureteral stent, for sarcoma. Renal function stable. CBC ok.       Ref Range & Units 7/26/22 1350   Sodium 135 - 145 mmol/L 136    Potassium 3.6 - 5.1 mmol/L 3.6    Potassium Comment Not Hemolyzed Not Hemolyzed    Chloride 100 - 110 mmol/L 100    Carbon Dioxide 21 - 33 mmol/L 31    Anion Gap 0 - 12 mmol/L 5    Glucose 65 - 100 mg/dL 77    BUN 8 - 23 mg/dL 14    Creatinine 0.50 - 1.00 mg/dL 0.83    GFRcr (Estimated) >=60 mL/min/1.73m2 89

## 2022-10-07 NOTE — LETTER
88/3/7926    Patient: Cara Garcia   YOB: 1980   Date of Visit: 10/7/2022     Leanne Kebede MD  [address]  [address] [facility] [address]  Via Fax: [phone]    Dear Leanne Kebede MD,      Thank you for referring Ms. Joe Hidalgo to [place] [address] for evaluation. My notes for this consultation are attached. If you have questions, please do not hesitate to call me. I look forward to following your patient along with you.       Sincerely,    Angel Christie MD

## 2022-10-07 NOTE — PROGRESS NOTES
Chief Complaint   Patient presents with    Follow-up    Hydronephrosis     1. Have you been to the ER, urgent care clinic since your last visit? Hospitalized since your last visit? No    2. Have you seen or consulted any other health care providers outside of the [address] [place] [address] since your last visit? Include any pap smears or colon screening.  No  Visit Vitals  /83 (BP 1 Location: Left upper arm, BP Patient Position: Sitting, BP Cuff Size: Adult)   Pulse 76   Ht 5' 4\" (1.626 m)   Wt 207 lb (93.9 kg)   BMI 35.53 kg/m²

## 2022-10-08 LAB
APPEARANCE UR: CLEAR
BACTERIA #/AREA URNS HPF: NORMAL /[HPF]
BILIRUB UR QL STRIP: NEGATIVE
CASTS URNS QL MICRO: NORMAL /LPF
COLOR UR: YELLOW
EPI CELLS #/AREA URNS HPF: NORMAL /HPF (ref 0–10)
GLUCOSE UR QL STRIP: NEGATIVE
HGB UR QL STRIP: ABNORMAL
KETONES UR QL STRIP: NEGATIVE
LEUKOCYTE ESTERASE UR QL STRIP: NEGATIVE
MICRO URNS: ABNORMAL
NITRITE UR QL STRIP: NEGATIVE
PH UR STRIP: 7 [PH] (ref 5–7.5)
PROT UR QL STRIP: NEGATIVE
RBC #/AREA URNS HPF: NORMAL /HPF (ref 0–2)
SP GR UR STRIP: 1.01 (ref 1–1.03)
UROBILINOGEN UR STRIP-MCNC: 0.2 MG/DL (ref 0.2–1)
WBC #/AREA URNS HPF: NORMAL /HPF (ref 0–5)

## 2022-10-08 NOTE — ASSESSMENT & PLAN NOTE
She is due for a stent change. The patient was counseled on the risks, benefits and expected course of surgery. Surgical risk factors include concurrent diagnoses and PMH. Surgery has risks of bleeding, infection, injury, pain, death or other consequences. Perioperative medications and antibiotic use were discussed including the potential for reactions and side effects. Some specific risks of surgery were discussed as well. She wishes to proceed.

## 2022-10-31 ENCOUNTER — APPOINTMENT (OUTPATIENT)
Dept: GENERAL RADIOLOGY | Age: 42
End: 2022-10-31
Attending: UROLOGY
Payer: COMMERCIAL

## 2022-10-31 ENCOUNTER — ANESTHESIA (OUTPATIENT)
Dept: SURGERY | Age: 42
End: 2022-10-31
Payer: COMMERCIAL

## 2022-10-31 ENCOUNTER — HOSPITAL ENCOUNTER (OUTPATIENT)
Age: 42
Discharge: HOME | End: 2022-10-31
Attending: UROLOGY | Admitting: UROLOGY
Payer: COMMERCIAL

## 2022-10-31 ENCOUNTER — ANESTHESIA EVENT (OUTPATIENT)
Dept: SURGERY | Age: 42
End: 2022-10-31
Payer: COMMERCIAL

## 2022-10-31 VITALS
BODY MASS INDEX: 36.19 KG/M2 | DIASTOLIC BLOOD PRESSURE: 69 MMHG | TEMPERATURE: 97.6 F | HEART RATE: 79 BPM | OXYGEN SATURATION: 98 % | WEIGHT: 212 LBS | SYSTOLIC BLOOD PRESSURE: 102 MMHG | HEIGHT: 64 IN | RESPIRATION RATE: 20 BRPM

## 2022-10-31 PROBLEM — N13.30 HYDRONEPHROSIS: Status: ACTIVE | Noted: 2022-10-31

## 2022-10-31 LAB
ANION GAP BLD CALC-SCNC: 10 MMOL/L
CA-I BLD-MCNC: 1.21 MMOL/L (ref 1.12–1.32)
CHLORIDE BLD-SCNC: 105 MMOL/L (ref 98–107)
CO2 BLD-SCNC: 29 MMOL/L
CREAT UR-MCNC: 0.73 MG/DL (ref 0.6–1.3)
GLUCOSE BLD STRIP.AUTO-MCNC: 84 MG/DL (ref 65–100)
POTASSIUM BLD-SCNC: 3.4 MMOL/L (ref 3.5–5.5)
SODIUM BLD-SCNC: 143 MMOL/L (ref 136–145)

## 2022-10-31 PROCEDURE — 74011250636 HC RX REV CODE- 250/636: Performed by: NURSE ANESTHETIST, CERTIFIED REGISTERED

## 2022-10-31 PROCEDURE — 76060000032 HC ANESTHESIA 0.5 TO 1 HR: Performed by: UROLOGY

## 2022-10-31 PROCEDURE — 74011000250 HC RX REV CODE- 250: Performed by: NURSE ANESTHETIST, CERTIFIED REGISTERED

## 2022-10-31 PROCEDURE — 76210000006 HC OR PH I REC 0.5 TO 1 HR: Performed by: UROLOGY

## 2022-10-31 PROCEDURE — 2709999900 HC NON-CHARGEABLE SUPPLY: Performed by: UROLOGY

## 2022-10-31 PROCEDURE — 52332 CYSTOSCOPY AND TREATMENT: CPT | Performed by: UROLOGY

## 2022-10-31 PROCEDURE — 74011000250 HC RX REV CODE- 250: Performed by: UROLOGY

## 2022-10-31 PROCEDURE — C2617 STENT, NON-COR, TEM W/O DEL: HCPCS | Performed by: UROLOGY

## 2022-10-31 PROCEDURE — 80047 BASIC METABLC PNL IONIZED CA: CPT

## 2022-10-31 PROCEDURE — 76000 FLUOROSCOPY <1 HR PHYS/QHP: CPT

## 2022-10-31 PROCEDURE — 76010000138 HC OR TIME 0.5 TO 1 HR: Performed by: UROLOGY

## 2022-10-31 PROCEDURE — 74011250636 HC RX REV CODE- 250/636: Performed by: UROLOGY

## 2022-10-31 PROCEDURE — 76210000022 HC REC RM PH II 1.5 TO 2 HR: Performed by: UROLOGY

## 2022-10-31 PROCEDURE — 74011000636 HC RX REV CODE- 636: Performed by: UROLOGY

## 2022-10-31 PROCEDURE — 74420 UROGRAPHY RTRGR +-KUB: CPT | Performed by: UROLOGY

## 2022-10-31 PROCEDURE — C1758 CATHETER, URETERAL: HCPCS | Performed by: UROLOGY

## 2022-10-31 DEVICE — VARIABLE LENGTH URETERAL STENT SET
Type: IMPLANTABLE DEVICE | Site: URETER | Status: FUNCTIONAL
Brand: CONTOUR VL™

## 2022-10-31 RX ORDER — SODIUM CHLORIDE 0.9 % (FLUSH) 0.9 %
5-40 SYRINGE (ML) INJECTION AS NEEDED
Status: DISCONTINUED | OUTPATIENT
Start: 2022-10-31 | End: 2022-10-31 | Stop reason: HOSPADM

## 2022-10-31 RX ORDER — MORPHINE SULFATE 10 MG/ML
2 INJECTION, SOLUTION INTRAMUSCULAR; INTRAVENOUS
Status: DISCONTINUED | OUTPATIENT
Start: 2022-10-31 | End: 2022-10-31 | Stop reason: HOSPADM

## 2022-10-31 RX ORDER — HYDRALAZINE HYDROCHLORIDE 20 MG/ML
10 INJECTION INTRAMUSCULAR; INTRAVENOUS
Status: DISCONTINUED | OUTPATIENT
Start: 2022-10-31 | End: 2022-10-31 | Stop reason: HOSPADM

## 2022-10-31 RX ORDER — OXYCODONE HYDROCHLORIDE 5 MG/1
5 TABLET ORAL
Status: CANCELLED | OUTPATIENT
Start: 2022-10-31 | End: 2022-11-01

## 2022-10-31 RX ORDER — HYDROMORPHONE HYDROCHLORIDE 2 MG/1
1 TABLET ORAL
Status: CANCELLED | OUTPATIENT
Start: 2022-10-31 | End: 2022-11-01

## 2022-10-31 RX ORDER — MIDAZOLAM HYDROCHLORIDE 1 MG/ML
1 INJECTION, SOLUTION INTRAMUSCULAR; INTRAVENOUS AS NEEDED
Status: DISCONTINUED | OUTPATIENT
Start: 2022-10-31 | End: 2022-10-31 | Stop reason: HOSPADM

## 2022-10-31 RX ORDER — LIDOCAINE HYDROCHLORIDE 20 MG/ML
INJECTION, SOLUTION EPIDURAL; INFILTRATION; INTRACAUDAL; PERINEURAL AS NEEDED
Status: DISCONTINUED | OUTPATIENT
Start: 2022-10-31 | End: 2022-10-31 | Stop reason: HOSPADM

## 2022-10-31 RX ORDER — NORETHINDRONE AND ETHINYL ESTRADIOL 0.5-0.035
5 KIT ORAL AS NEEDED
Status: DISCONTINUED | OUTPATIENT
Start: 2022-10-31 | End: 2022-10-31 | Stop reason: HOSPADM

## 2022-10-31 RX ORDER — SODIUM CHLORIDE 0.9 % (FLUSH) 0.9 %
5-40 SYRINGE (ML) INJECTION EVERY 8 HOURS
Status: DISCONTINUED | OUTPATIENT
Start: 2022-10-31 | End: 2022-10-31 | Stop reason: HOSPADM

## 2022-10-31 RX ORDER — METOPROLOL TARTRATE 5 MG/5ML
2.5 INJECTION INTRAVENOUS
Status: DISCONTINUED | OUTPATIENT
Start: 2022-10-31 | End: 2022-10-31 | Stop reason: HOSPADM

## 2022-10-31 RX ORDER — HYDROMORPHONE HYDROCHLORIDE 1 MG/ML
0.5 INJECTION, SOLUTION INTRAMUSCULAR; INTRAVENOUS; SUBCUTANEOUS
Status: DISCONTINUED | OUTPATIENT
Start: 2022-10-31 | End: 2022-10-31 | Stop reason: HOSPADM

## 2022-10-31 RX ORDER — OXYCODONE AND ACETAMINOPHEN 5; 325 MG/1; MG/1
1 TABLET ORAL AS NEEDED
Status: DISCONTINUED | OUTPATIENT
Start: 2022-10-31 | End: 2022-10-31 | Stop reason: HOSPADM

## 2022-10-31 RX ORDER — SODIUM CHLORIDE, SODIUM LACTATE, POTASSIUM CHLORIDE, CALCIUM CHLORIDE 600; 310; 30; 20 MG/100ML; MG/100ML; MG/100ML; MG/100ML
1000 INJECTION, SOLUTION INTRAVENOUS CONTINUOUS
Status: DISCONTINUED | OUTPATIENT
Start: 2022-10-31 | End: 2022-10-31 | Stop reason: HOSPADM

## 2022-10-31 RX ORDER — FENTANYL CITRATE 50 UG/ML
50 INJECTION, SOLUTION INTRAMUSCULAR; INTRAVENOUS AS NEEDED
Status: DISCONTINUED | OUTPATIENT
Start: 2022-10-31 | End: 2022-10-31 | Stop reason: HOSPADM

## 2022-10-31 RX ORDER — FENTANYL CITRATE 50 UG/ML
50 INJECTION, SOLUTION INTRAMUSCULAR; INTRAVENOUS
Status: DISCONTINUED | OUTPATIENT
Start: 2022-10-31 | End: 2022-10-31 | Stop reason: HOSPADM

## 2022-10-31 RX ORDER — FENTANYL CITRATE 50 UG/ML
INJECTION, SOLUTION INTRAMUSCULAR; INTRAVENOUS AS NEEDED
Status: DISCONTINUED | OUTPATIENT
Start: 2022-10-31 | End: 2022-10-31 | Stop reason: HOSPADM

## 2022-10-31 RX ORDER — LABETALOL HCL 20 MG/4 ML
5 SYRINGE (ML) INTRAVENOUS
Status: DISCONTINUED | OUTPATIENT
Start: 2022-10-31 | End: 2022-10-31 | Stop reason: HOSPADM

## 2022-10-31 RX ORDER — MIDAZOLAM HYDROCHLORIDE 1 MG/ML
INJECTION, SOLUTION INTRAMUSCULAR; INTRAVENOUS AS NEEDED
Status: DISCONTINUED | OUTPATIENT
Start: 2022-10-31 | End: 2022-10-31 | Stop reason: HOSPADM

## 2022-10-31 RX ORDER — MORPHINE SULFATE 15 MG/1
15 TABLET ORAL
Status: CANCELLED | OUTPATIENT
Start: 2022-10-31 | End: 2022-11-01

## 2022-10-31 RX ORDER — OXYCODONE AND ACETAMINOPHEN 5; 325 MG/1; MG/1
1 TABLET ORAL
Status: CANCELLED | OUTPATIENT
Start: 2022-10-31 | End: 2022-11-01

## 2022-10-31 RX ORDER — MIDAZOLAM HYDROCHLORIDE 1 MG/ML
0.5 INJECTION, SOLUTION INTRAMUSCULAR; INTRAVENOUS
Status: DISCONTINUED | OUTPATIENT
Start: 2022-10-31 | End: 2022-10-31 | Stop reason: HOSPADM

## 2022-10-31 RX ORDER — ONDANSETRON 2 MG/ML
4 INJECTION INTRAMUSCULAR; INTRAVENOUS AS NEEDED
Status: DISCONTINUED | OUTPATIENT
Start: 2022-10-31 | End: 2022-10-31 | Stop reason: HOSPADM

## 2022-10-31 RX ORDER — DIPHENHYDRAMINE HYDROCHLORIDE 50 MG/ML
12.5 INJECTION, SOLUTION INTRAMUSCULAR; INTRAVENOUS AS NEEDED
Status: DISCONTINUED | OUTPATIENT
Start: 2022-10-31 | End: 2022-10-31 | Stop reason: HOSPADM

## 2022-10-31 RX ORDER — LIDOCAINE HYDROCHLORIDE 10 MG/ML
0.1 INJECTION, SOLUTION EPIDURAL; INFILTRATION; INTRACAUDAL; PERINEURAL AS NEEDED
Status: DISCONTINUED | OUTPATIENT
Start: 2022-10-31 | End: 2022-10-31 | Stop reason: HOSPADM

## 2022-10-31 RX ORDER — PROPOFOL 10 MG/ML
INJECTION, EMULSION INTRAVENOUS AS NEEDED
Status: DISCONTINUED | OUTPATIENT
Start: 2022-10-31 | End: 2022-10-31 | Stop reason: HOSPADM

## 2022-10-31 RX ORDER — ONDANSETRON 2 MG/ML
INJECTION INTRAMUSCULAR; INTRAVENOUS AS NEEDED
Status: DISCONTINUED | OUTPATIENT
Start: 2022-10-31 | End: 2022-10-31 | Stop reason: HOSPADM

## 2022-10-31 RX ORDER — DEXAMETHASONE SODIUM PHOSPHATE 4 MG/ML
INJECTION, SOLUTION INTRA-ARTICULAR; INTRALESIONAL; INTRAMUSCULAR; INTRAVENOUS; SOFT TISSUE AS NEEDED
Status: DISCONTINUED | OUTPATIENT
Start: 2022-10-31 | End: 2022-10-31 | Stop reason: HOSPADM

## 2022-10-31 RX ORDER — HYDROCODONE BITARTRATE AND ACETAMINOPHEN 5; 325 MG/1; MG/1
1 TABLET ORAL
Status: CANCELLED | OUTPATIENT
Start: 2022-10-31 | End: 2022-11-01

## 2022-10-31 RX ADMIN — DEXAMETHASONE SODIUM PHOSPHATE 4 MG: 4 INJECTION, SOLUTION INTRA-ARTICULAR; INTRALESIONAL; INTRAMUSCULAR; INTRAVENOUS; SOFT TISSUE at 09:23

## 2022-10-31 RX ADMIN — PROPOFOL 200 MG: 10 INJECTION, EMULSION INTRAVENOUS at 09:19

## 2022-10-31 RX ADMIN — FENTANYL CITRATE 50 MCG: 50 INJECTION, SOLUTION INTRAMUSCULAR; INTRAVENOUS at 09:23

## 2022-10-31 RX ADMIN — LIDOCAINE HYDROCHLORIDE 100 MG: 20 INJECTION, SOLUTION EPIDURAL; INFILTRATION; INTRACAUDAL; PERINEURAL at 09:19

## 2022-10-31 RX ADMIN — PHENYLEPHRINE HYDROCHLORIDE 150 MCG: 10 INJECTION INTRAVENOUS at 09:40

## 2022-10-31 RX ADMIN — FENTANYL CITRATE 25 MCG: 50 INJECTION, SOLUTION INTRAMUSCULAR; INTRAVENOUS at 09:45

## 2022-10-31 RX ADMIN — ONDANSETRON 4 MG: 2 INJECTION INTRAMUSCULAR; INTRAVENOUS at 09:23

## 2022-10-31 RX ADMIN — PHENYLEPHRINE HYDROCHLORIDE 150 MCG: 10 INJECTION INTRAVENOUS at 09:45

## 2022-10-31 RX ADMIN — SODIUM CHLORIDE, POTASSIUM CHLORIDE, SODIUM LACTATE AND CALCIUM CHLORIDE 1000 ML: 600; 310; 30; 20 INJECTION, SOLUTION INTRAVENOUS at 07:19

## 2022-10-31 RX ADMIN — PHENYLEPHRINE HYDROCHLORIDE 150 MCG: 10 INJECTION INTRAVENOUS at 09:32

## 2022-10-31 RX ADMIN — MIDAZOLAM HYDROCHLORIDE 2 MG: 2 INJECTION, SOLUTION INTRAMUSCULAR; INTRAVENOUS at 09:11

## 2022-10-31 RX ADMIN — FENTANYL CITRATE 25 MCG: 50 INJECTION, SOLUTION INTRAMUSCULAR; INTRAVENOUS at 09:34

## 2022-10-31 RX ADMIN — CEFAZOLIN SODIUM 2 G: 1 INJECTION, POWDER, FOR SOLUTION INTRAMUSCULAR; INTRAVENOUS at 09:23

## 2022-10-31 NOTE — PROGRESS NOTES
Pt assisted to br , voided , small amt bleeding noted iv dc'd site intact no swelling noted , discharge instructions given to pt and pt's mother Dankmichael Jen , both verbazlied understanding , no distress noted

## 2022-10-31 NOTE — PERIOP NOTES
TRANSFER - OUT REPORT:    Verbal report given to Verda Mohs, RN(name) on [address]  being transferred to [place] [address](unit) for routine post - op       Report consisted of patients Situation, Background, Assessment and   Recommendations(SBAR). Information from the following report(s) SBAR, Procedure Summary, Intake/Output, and MAR was reviewed with the receiving nurse. Opportunity for questions and clarification was provided.       Patient transported with:   Registered Nurse

## 2022-10-31 NOTE — OP NOTES
Patient: Katy Pang MRN: [id number]  SSN: [id number]    YOB: 1980  Age: 38 y.o. Sex: female              UROLOGY OPERATIVE NOTE    Pre-operative Diagnosis: right ureteral obstruction  Post-operative Diagnosis: same   Procedure: cystoscopy, right retrograde pyelogram, ureteral stent placement. Surgeon: Carey Juan MD  Assistant: none  Anesthesia:  Per anesthesia  Findings: Interpretation of right retrograde pyelogram:  There was a proximal ureteral stricture, tight and several centimeters in length. UPJ with kinking. The renal pelvis is mildly dilated. The calyces were blunted. The entire calyceal system was not opacified. Estimated Blood Loss:    none  Drains: 7F x 22/32 variable length right ureteral stent  Specimens: none  Complications: none           Procedure Details: The patient was seen in the pre-operative area. The risks, benefits, complications, alternative treatment options, and expected outcomes were again discussed with the patient. The possibilities of reaction to medication, pain, infection, bleeding, major cardiovascular event, death, damage to surrounding structures were specifically addressed. Informed consent was then obtained. Upon arrival to the operative suite, the patient, procedure, and side were confirmed via a pre-operative \"time-out\". All were in agreement. The patient was carefully positioned and anesthesia was undertaken. Sterile prep and drape was accomplished. CYSTOSCOPY/ STENT REMOVAL  Patient presented for cystoscopy and ureteral stent removal.  The patient was placed supine on the procedure table and the genitals were prepped and with chlorhexidine. Using 16 [place] flexible cystoscope, cystourethroscopy was performed. The urethra was patent without stricturing. The visualized portions of the bladder mucosa was without trabeculation, tumors or concerning erythema. There was a stent in the Right ureter.   A 0.035 guidewire was inserted alongside to the stent to the proximal ureter. The scope was removed and the wire left in place. The scope was reinserted. The stent was then grasped and withdrawn intact. A 5F open ended catheter was inserted over the wire and the wire removed. A retrograde pyelogram was performed. The findings were as above. The wire was reinserted. With difficulty the wire was navigated past the tortuous UPJ, into the renal pelvis. The catheter was advanced to the renal pelvis, measuring 28cm. The catheter was removed. Over the wire a 7 [place] by 22/32 cm variable length ureteral stent was inserted with a curl in the renal pelvis and in the bladder on fluoroscopy and direct visualization. The bladder was drained. The scope was removed. The patient was transported in stable condition to recovery.     Halley Barriga MD

## 2022-10-31 NOTE — ANESTHESIA PREPROCEDURE EVALUATION
Relevant Problems   RENAL FAILURE   (+) Hydronephrosis   (+) Other hydronephrosis      ENDOCRINE   (+) Morbid obesity (HCC)      PERSONAL HX & FAMILY HX OF CANCER   (+) Abdominal malignancy (HCC)   (+) Sarcoma (HCC)       Anesthetic History   No history of anesthetic complications            Review of Systems / Medical History  Patient summary reviewed, nursing notes reviewed and pertinent labs reviewed    Pulmonary            Asthma        Neuro/Psych   Within defined limits           Cardiovascular              Hyperlipidemia         GI/Hepatic/Renal  Within defined limits              Endo/Other        Morbid obesity     Other Findings            Past Medical History:   Diagnosis Date    Asthma     Hypercholesterolemia     Ill-defined condition     elevated heart rate    Ill-defined condition     chemotherapy    Irregular periods     Morbid obesity ([address] 09/01/2021    Primary intra-abdominal sarcoma ([address] 08/01/2021    [facility]     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       Past Surgical History:   Procedure Laterality Date    HX OTHER SURGICAL Right 11/2021    port placement    HX UROLOGICAL  09/30/2021    CYSTOSCOPY     HX UROLOGICAL Bilateral 09/30/2021    RETROGRADE PYELOGRAMS    HX UROLOGICAL Right 09/30/2021    URETERAL STENT PLACEMENT     HX UROLOGICAL  04/21/2022    CYSTOSCOPY     HX UROLOGICAL  04/21/2022    RETROGRADE PYELOGRAMS    HX UROLOGICAL Right 04/21/2022    URETERAL STENT EXCHANGE     HX WISDOM TEETH EXTRACTION         Current Outpatient Medications   Medication Instructions    albuterol (PROVENTIL HFA, VENTOLIN HFA, PROAIR HFA) 90 mcg/actuation inhaler INHALE 2 PUFFS BY MOUTH EVERY 4-6 HOURS AS NEEDED FOR 90 DAYS    apixaban (ELIQUIS) 5 mg, Oral, 2 TIMES DAILY    calcium carbonate (CALTRATE 600 PO) 1 Tablet, Oral, DAILY    cyclobenzaprine (FLEXERIL) 10 mg tablet Oral, 3 TIMES DAILY AS NEEDED    cyproheptadine (PERIACTIN) 4 mg tablet Oral, 3 TIMES DAILY AS NEEDED    fluticasone propion/salmeterol (WIXELA INHUB IN) 1 Puff, Inhalation, DAILY    folic acid (FOLVITE) 1 mg tablet Oral, DAILY    furosemide (LASIX) 40 mg tablet TAKE 1 TABLET BY MOUTH 2 TIMES DAILY. MAY DECREASE TO 1 TABLET DAILY AS NEEDED.  metoprolol tartrate (LOPRESSOR) 25 mg tablet Oral, 2 TIMES DAILY    multivitamin, tx-iron-ca-min (THERA-M w/ IRON) 9 mg iron-400 mcg tab tablet 1 Tablet, Oral, DAILY    ondansetron hcl (ZOFRAN) 8 mg, Oral, EVERY 8 HOURS AS NEEDED    rosuvastatin (CRESTOR) 10 mg, Oral, EVERY BEDTIME    valACYclovir (VALTREX) 500 mg tablet TAKE 1 TABLET BY MOUTH TWO TIMES A DAY.        Current Facility-Administered Medications   Medication Dose Route Frequency    lactated Ringers infusion 1,000 mL  1,000 mL IntraVENous CONTINUOUS    ceFAZolin (ANCEF) 2 g in sterile water (preservative free) 20 mL IV syringe  2 g IntraVENous ONCE       Patient Vitals for the past 24 hrs:   Temp Pulse Resp BP SpO2   10/31/22 0703 36.9 °C (98.4 °F) 98 18 106/67 99 %       No results found for: WBC, WBCLT, HGBPOC, HGB, HGBP, HCTPOC, HCT, PHCT, RBCH, PLT, MCV, HGBEXT, HCTEXT, PLTEXT  No results found for: NA, K, CL, CO2, AGAP, GLU, BUN, CREA, BUCR, GFRAA, GFRNA, CA, GFRAA  No results found for: APTT, PTP, INR, INREXT  Lab Results   Component Value Date/Time    Glucose (POC) 107 04/21/2022 07:34 AM    Glucose, POC 84 10/31/2022 07:25 AM     Physical Exam    Airway  Mallampati: III  TM Distance: 4 - 6 cm  Neck ROM: normal range of motion   Mouth opening: Normal     Cardiovascular    Rhythm: regular  Rate: normal         Dental  No notable dental hx       Pulmonary  Breath sounds clear to auscultation               Abdominal  GI exam deferred       Other Findings            Anesthetic Plan    ASA: 3  Anesthesia type: general          Induction: Intravenous  Anesthetic plan and risks discussed with: Patient and Family

## 2022-10-31 NOTE — H&P
UROLOGY HISTORY AND PHYSICAL  Kirk BRODIELizette HardyLisa, [address] [facility]    Patient: Francisca Clayton MRN: [id number]  SSN: [id number]    YOB: 1980  Age: 38 y.o. Sex: female          Date of Encounter:  October 31, 2022  ADMITTED: 10/31/2022 to Melo Oates MD by Tosha Ballard MD for Other hydronephrosis [N13.39]; Retained ureteral stent [Z96.0]; Hydronephrosis [N13.30]  Chief Complaint:  ureteral stent, obstruction             History of Present Illness:  Patient is a 38 y.o. female admitted 10/31/2022 to the hospital for Other hydronephrosis [N13.39]; Retained ureteral stent [Z96.0]; Hydronephrosis [N13.30]. The patient is here for surgery. She has a h/o sarcoma. She has right hydronephrosis managed with a stent on the right. She is due for a routine change. See the problem list.     Problem List  Date Reviewed: 5/31/2022            Codes Class Noted    Hydronephrosis ICD-10-CM: N13.30  ICD-9-CM: 687  10/31/2022        Vulvar ulceration ICD-10-CM: N76.6  ICD-9-CM: 616.50  11/16/2021        Retained ureteral stent ICD-10-CM: Z96.0  ICD-9-CM: V43.89  10/5/2021    Overview Addendum 5/26/2022 10:17 AM by Jono Lerma NP     She is status post cystoscopy, bilateral RPG, and right ureteral stent placement on 9/30/2021. She is s/p RIGHT stent exchange on 4/21/22. Abdominal malignancy [facility]) ICD-10-CM: C76.2  ICD-9-CM: 195.2  9/29/2021    Overview Signed 4/6/2022  8:52 AM by Jono Lerma NP     9/29/21: large abdominal mass with probable metastasis to sacrum, liver, and lung. 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 She sees Dr. Aren Reece at [facility].               Other hydronephrosis ICD-10-CM: N13.39  ICD-9-CM: 905  9/27/2021    Overview Addendum 10/5/2021  3:13 PM by Jono Lerma NP     Referred from [address] for abdominal mass that is displacing adjacent organs and causing right-sided hydronephrosis and hydroureter on CT 9/2/21. Imaging not available. Creatinine 8/27/21 was 0.72    9/30/21: s/p cystoscopy, bilateral RPG, right ureteral stent placement. Normal left RPG. Ureter had a serpentine course with marked medial deviation on the right side. The proximal ureter was S-shaped and there was moderate hydronephrosis and blunting. Due to the deviated course the ureter appeared very stretched out and greater than 28 cm from the UPJ to the ureteral orifice. Morbid obesity [facility]) ICD-10-CM: E66.01  ICD-9-CM: 278.01  9/1/2021            Past Medical History: Allergies   Allergen Reactions    Codeine Hives and Itching    Phenergan [Promethazine] Other (comments)     Patient denies and stated she currently takes the medication    Toradol [Ketorolac] Rash     nausea    Vicodin [Hydrocodone-Acetaminophen] Rash     nausea      Prior to Admission medications    Medication Sig Start Date End Date Taking? Authorizing Provider   calcium carbonate (CALTRATE 600 PO) Take 1 Tablet by mouth daily. Yes Provider, Historical   apixaban (ELIQUIS) 5 mg tablet Take 5 mg by mouth two (2) times a day. Yes Provider, Historical   furosemide (LASIX) 40 mg tablet TAKE 1 TABLET BY MOUTH 2 TIMES DAILY. MAY DECREASE TO 1 TABLET DAILY AS NEEDED. 4/7/22  Yes Provider, Historical   fluticasone propion/salmeterol (WIXELA INHUB IN) Take 1 Puff by inhalation daily. Yes Provider, Historical   folic acid (FOLVITE) 1 mg tablet Take  by mouth daily. Yes Provider, Historical   rosuvastatin (CRESTOR) 10 mg tablet Take 10 mg by mouth nightly. Yes Provider, Historical   cyclobenzaprine (FLEXERIL) 10 mg tablet Take  by mouth three (3) times daily as needed for Muscle Spasm(s). Yes Provider, Historical   multivitamin, tx-iron-ca-min (THERA-M w/ IRON) 9 mg iron-400 mcg tab tablet Take 1 Tablet by mouth daily.    Yes Provider, Historical   metoprolol tartrate (LOPRESSOR) 25 mg tablet Take  by mouth two (2) times a day. Yes Provider, Historical   ondansetron hcl (ZOFRAN) 8 mg tablet Take 8 mg by mouth every eight (8) hours as needed for Nausea or Vomiting. Yes Provider, Historical   cyproheptadine (PERIACTIN) 4 mg tablet Take  by mouth three (3) times daily as needed. Yes Provider, Historical   valACYclovir (VALTREX) 500 mg tablet TAKE 1 TABLET BY MOUTH TWO TIMES A DAY. 22   Zhane Giang MD   albuterol (PROVENTIL HFA, VENTOLIN HFA, PROAIR HFA) 90 mcg/actuation inhaler INHALE 2 PUFFS BY MOUTH EVERY 4-6 HOURS AS NEEDED FOR 90 DAYS 3/31/22   Provider, Historical      PMHx:  has a past medical history of Asthma, Hypercholesterolemia, Ill-defined condition, Ill-defined condition, Irregular periods, Morbid obesity ([facility] (2021), Primary intra-abdominal sarcoma ([facility] (2021), and Thromboembolus ([facility] PSurgHx:  has a past surgical history that includes hx wisdom teeth extraction; hx other surgical (Right, 2021); hx urological (2021); hx urological (Bilateral, 2021); hx urological (Right, 2021); hx urological (2022); hx urological (2022); and hx urological (Right, 2022). PSocHx:  reports that she has never smoked. She has never used smokeless tobacco. She reports that she does not currently use alcohol. She reports current drug use. Drug: Marijuana. FamHx:   Family History   Problem Relation Age of Onset    Hypertension Mother     Other Mother         uterine fibroids    Heart Disease Father     Hypertension Father     Diabetes Father      ROS:  Admission ROS by Joy Fuentes MD from 10/31/2022 were reviewed with the patient and changes (other than per HPI) include: none. Physical Exam:            Vitals[de-identified]    Temp (24hrs), Av.4 °F (36.9 °C), Min:98.4 °F (36.9 °C), Max:98.4 °F (36.9 °C)   Blood pressure 106/67, pulse 98, temperature 98.4 °F (36.9 °C), resp.  rate 18, height 5' 4\" (1.626 m), weight 212 lb (96.2 kg), last menstrual period 10/01/2021, SpO2 99 %. Estimated body mass index is 36.39 kg/m² as calculated from the following:    Height as of this encounter: 5' 4\" (1.626 m). Weight as of this encounter: 212 lb (96.2 kg). I&O's:    No intake/output data recorded. General Well developed, in NAD   Conjunctiva/Lids Normal without gross defects   Neck Supple without obvious, masses   Respiratory Effort Breathing easily, no audible wheezing, rhonchi, stridor   CV RRR   Abdomen / Flank Soft, non tender without guarding or rebound, without obvious masses, no CVA tenderness   Neurologic Grossly normal without focal deficits           Assessment/Plan:  Ureteral obstruction secondary to sarcoma. Cystoscopy, bilateral retrograde pyelography, ureteral stent change (right)  The patient was counseled on the risks, benefits and expected course of surgery. Surgical risk factors include concurrent diagnoses and PMH. Surgery has risks of bleeding, infection, injury, pain, death or other consequences. Perioperative medications and antibiotic use were discussed including the potential for reactions and side effects. Some specific risks of surgery were discussed as well.        Cystoscopy, retrograde pyelograms  RIGHT  ureteral stent exchange    Signed By: Michaela Bailey MD  - October 31, 2022

## 2022-11-02 NOTE — ANESTHESIA POSTPROCEDURE EVALUATION
Procedure(s):  CYSTOURETHROSCOPY, RIGHT URETERAL STENT CHANGE. general    Anesthesia Post Evaluation      Multimodal analgesia: multimodal analgesia used between 6 hours prior to anesthesia start to PACU discharge  Patient location during evaluation: PACU  Patient participation: complete - patient participated  Level of consciousness: awake  Pain score: 0  Pain management: adequate  Airway patency: patent  Anesthetic complications: no  Cardiovascular status: acceptable  Respiratory status: acceptable  Hydration status: acceptable  Post anesthesia nausea and vomiting:  controlled  Final Post Anesthesia Temperature Assessment:  Normothermia (36.0-37.5 degrees C)      INITIAL Post-op Vital signs:   Vitals Value Taken Time   /73 10/31/22 1015   Temp 36.4 °C (97.5 °F) 10/31/22 0956   Pulse 88 10/31/22 1016   Resp 23 10/31/22 1016   SpO2 97 % 10/31/22 1016   Vitals shown include unvalidated device data.

## 2022-11-14 ENCOUNTER — HOSPITAL ENCOUNTER (EMERGENCY)
Age: 42
Discharge: HOME | End: 2022-11-14
Attending: EMERGENCY MEDICINE
Payer: COMMERCIAL

## 2022-11-14 VITALS
RESPIRATION RATE: 18 BRPM | BODY MASS INDEX: 36.19 KG/M2 | DIASTOLIC BLOOD PRESSURE: 76 MMHG | HEIGHT: 64 IN | WEIGHT: 212 LBS | OXYGEN SATURATION: 100 % | TEMPERATURE: 98 F | SYSTOLIC BLOOD PRESSURE: 109 MMHG | HEART RATE: 72 BPM

## 2022-11-14 DIAGNOSIS — E86.1 HYPOVOLEMIA: ICD-10-CM

## 2022-11-14 DIAGNOSIS — E87.6 HYPOKALEMIA: Primary | ICD-10-CM

## 2022-11-14 LAB
ALBUMIN SERPL-MCNC: 2.9 G/DL (ref 3.5–5)
ALBUMIN/GLOB SERPL: 0.5 {RATIO} (ref 1.1–2.2)
ALP SERPL-CCNC: 88 U/L (ref 45–117)
ALT SERPL-CCNC: 26 U/L (ref 12–78)
ANION GAP SERPL CALC-SCNC: 7 MMOL/L (ref 5–15)
AST SERPL W P-5'-P-CCNC: 15 U/L (ref 15–37)
BASOPHILS # BLD: 0 K/UL (ref 0–0.2)
BASOPHILS NFR BLD: 2 % (ref 0–2.5)
BILIRUB SERPL-MCNC: 0.3 MG/DL (ref 0.2–1)
BUN SERPL-MCNC: 27 MG/DL (ref 6–20)
BUN/CREAT SERPL: 20 (ref 12–20)
CA-I BLD-MCNC: 8.8 MG/DL (ref 8.5–10.1)
CHLORIDE SERPL-SCNC: 100 MMOL/L (ref 97–108)
CO2 SERPL-SCNC: 27 MMOL/L (ref 21–32)
CREAT SERPL-MCNC: 1.32 MG/DL (ref 0.55–1.02)
EOSINOPHIL # BLD: 0 K/UL (ref 0–0.7)
EOSINOPHIL NFR BLD: 4 % (ref 0.9–2.9)
ERYTHROCYTE [DISTWIDTH] IN BLOOD BY AUTOMATED COUNT: 22.3 % (ref 11.5–14.5)
GLOBULIN SER CALC-MCNC: 5.3 G/DL (ref 2–4)
GLUCOSE SERPL-MCNC: 126 MG/DL (ref 65–100)
HCT VFR BLD AUTO: 31.7 % (ref 35–47)
HGB BLD-MCNC: 9.4 G/DL (ref 11.5–16)
LYMPHOCYTES # BLD: 0.6 K/UL (ref 1–4.8)
LYMPHOCYTES NFR BLD: 51 % (ref 20.5–51.1)
MAGNESIUM SERPL-MCNC: 1.8 MG/DL (ref 1.6–2.4)
MCH RBC QN AUTO: 23.9 PG (ref 26–34)
MCHC RBC AUTO-ENTMCNC: 29.7 G/DL (ref 30–36.5)
MCV RBC AUTO: 80.5 FL (ref 80–99)
MONOCYTES # BLD: 0 K/UL (ref 0.2–2.4)
MONOCYTES NFR BLD: 0 % (ref 1.7–9.3)
NEUTS SEG # BLD: 0.5 K/UL (ref 1.8–7.7)
NEUTS SEG NFR BLD: 43 % (ref 42–75)
NRBC # BLD: 0 K/UL (ref 0–0.01)
NRBC BLD-RTO: 0 PER 100 WBC
PLATELET # BLD AUTO: 163 K/UL (ref 150–400)
PMV BLD AUTO: 7.8 FL (ref 8.9–12.9)
POTASSIUM SERPL-SCNC: 2.4 MMOL/L (ref 3.5–5.1)
PROT SERPL-MCNC: 8.2 G/DL (ref 6.4–8.2)
RBC # BLD AUTO: 3.94 M/UL (ref 3.8–5.2)
SODIUM SERPL-SCNC: 134 MMOL/L (ref 136–145)
WBC # BLD AUTO: 1.2 K/UL (ref 3.6–11)

## 2022-11-14 PROCEDURE — 80053 COMPREHEN METABOLIC PANEL: CPT

## 2022-11-14 PROCEDURE — 93005 ELECTROCARDIOGRAM TRACING: CPT

## 2022-11-14 PROCEDURE — 74011250636 HC RX REV CODE- 250/636: Performed by: EMERGENCY MEDICINE

## 2022-11-14 PROCEDURE — 83735 ASSAY OF MAGNESIUM: CPT

## 2022-11-14 PROCEDURE — 96365 THER/PROPH/DIAG IV INF INIT: CPT

## 2022-11-14 PROCEDURE — 36415 COLL VENOUS BLD VENIPUNCTURE: CPT

## 2022-11-14 PROCEDURE — 85025 COMPLETE CBC W/AUTO DIFF WBC: CPT

## 2022-11-14 PROCEDURE — 74011250637 HC RX REV CODE- 250/637: Performed by: EMERGENCY MEDICINE

## 2022-11-14 PROCEDURE — 99284 EMERGENCY DEPT VISIT MOD MDM: CPT

## 2022-11-14 RX ORDER — POTASSIUM CHLORIDE 20 MEQ/1
40 TABLET, EXTENDED RELEASE ORAL
Status: COMPLETED | OUTPATIENT
Start: 2022-11-14 | End: 2022-11-14

## 2022-11-14 RX ORDER — POTASSIUM CHLORIDE 750 MG/1
10 TABLET, FILM COATED, EXTENDED RELEASE ORAL DAILY
Qty: 7 TABLET | Refills: 0 | Status: SHIPPED | OUTPATIENT
Start: 2022-11-14 | End: 2022-11-22

## 2022-11-14 RX ORDER — MAGNESIUM SULFATE 1 G/100ML
1 INJECTION INTRAVENOUS
Status: COMPLETED | OUTPATIENT
Start: 2022-11-14 | End: 2022-11-14

## 2022-11-14 RX ADMIN — MAGNESIUM SULFATE HEPTAHYDRATE 1 G: 1 INJECTION, SOLUTION INTRAVENOUS at 16:57

## 2022-11-14 RX ADMIN — SODIUM CHLORIDE 1000 ML: 9 INJECTION, SOLUTION INTRAVENOUS at 16:58

## 2022-11-14 RX ADMIN — POTASSIUM CHLORIDE 40 MEQ: 1500 TABLET, EXTENDED RELEASE ORAL at 16:33
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
## 2022-11-14 NOTE — DISCHARGE INSTRUCTIONS
Thank you! Thank you for allowing me to care for you in the emergency department. It is my goal to provide you with excellent care. If you have not received excellent quality care, please ask to speak to the nurse manager. Please fill out the survey that will come to you by mail or email since we listen to your feedback! Below you will find a list of your tests from today's visit. Should you have any questions, please do not hesitate to call the emergency department. Labs  Recent Results (from the past 12 hour(s))   MAGNESIUM    Collection Time: 11/14/22 11:50 AM   Result Value Ref Range    Magnesium 1.8 1.6 - 2.4 mg/dL   EKG, 12 LEAD, INITIAL    Collection Time: 11/14/22  3:45 PM   Result Value Ref Range    Ventricular Rate 80 BPM    Atrial Rate 79 BPM    P-R Interval 151 ms    QRS Duration 96 ms    Q-T Interval 438 ms    QTC Calculation (Bezet) 506 ms    Calculated P Axis 38 degrees    Calculated R Axis -7 degrees    Calculated T Axis 36 degrees    Diagnosis       Sinus rhythm  Probable left atrial enlargement  Left ventricular hypertrophy  Nonspecific T abnormalities, anterior leads  Borderline prolonged QT interval     CBC WITH AUTOMATED DIFF    Collection Time: 11/14/22  3:50 PM   Result Value Ref Range    WBC 1.2 (L) 3.6 - 11.0 K/uL    RBC 3.94 3.80 - 5.20 M/uL    HGB 9.4 (L) 11.5 - 16.0 g/dL    HCT 31.7 (L) 35.0 - 47.0 %    MCV 80.5 80.0 - 99.0 FL    MCH 23.9 (L) 26.0 - 34.0 PG    MCHC 29.7 (L) 30.0 - 36.5 g/dL    RDW 22.3 (H) 11.5 - 14.5 %    PLATELET 797 443 - 619 K/uL    MPV 7.8 (L) 8.9 - 12.9 FL    NRBC 0.0 0.0  WBC    ABSOLUTE NRBC 0.00 0.00 - 0.01 K/uL    NEUTROPHILS 43 42 - 75 %    LYMPHOCYTES 51 20.5 - 51.1 %    MONOCYTES 0 (L) 1.7 - 9.3 %    EOSINOPHILS 4 (H) 0.9 - 2.9 %    BASOPHILS 2 0.0 - 2.5 %    ABS. NEUTROPHILS 0.5 (L) 1.8 - 7.7 K/UL    ABS. LYMPHOCYTES 0.6 (L) 1.0 - 4.8 K/UL    ABS. MONOCYTES 0.0 (L) 0.2 - 2.4 K/UL    ABS. EOSINOPHILS 0.0 0.0 - 0.7 K/UL    ABS.  BASOPHILS 0.0 0.0 - 0.2 K/UL   METABOLIC PANEL, COMPREHENSIVE    Collection Time: 11/14/22  3:50 PM   Result Value Ref Range    Sodium 134 (L) 136 - 145 mmol/L    Potassium 2.4 (LL) 3.5 - 5.1 mmol/L    Chloride 100 97 - 108 mmol/L    CO2 27 21 - 32 mmol/L    Anion gap 7 5 - 15 mmol/L    Glucose 126 (H) 65 - 100 mg/dL    BUN 27 (H) 6 - 20 mg/dL    Creatinine 1.32 (H) 0.55 - 1.02 mg/dL    BUN/Creatinine ratio 20 12 - 20      eGFR 51 (L) >60 ml/min/1.73m2    Calcium 8.8 8.5 - 10.1 mg/dL    Bilirubin, total 0.3 0.2 - 1.0 mg/dL    AST (SGOT) 15 15 - 37 U/L    ALT (SGPT) 26 12 - 78 U/L    Alk. phosphatase 88 45 - 117 U/L    Protein, total 8.2 6.4 - 8.2 g/dL    Albumin 2.9 (L) 3.5 - 5.0 g/dL    Globulin 5.3 (H) 2.0 - 4.0 g/dL    A-G Ratio 0.5 (L) 1.1 - 2.2         Radiologic Studies  No orders to display     CT Results  (Last 48 hours)      None          CXR Results  (Last 48 hours)      None          ------------------------------------------------------------------------------------------------------------  The exam and treatment you received in the Emergency Department were for an urgent problem and are not intended as complete care. It is important that you follow-up with a doctor, nurse practitioner, or physician assistant to:  (1) confirm your diagnosis,  (2) re-evaluation of changes in your illness and treatment, and  (3) for ongoing care. Please take your discharge instructions with you when you go to your follow-up appointment. If you have any problem arranging a follow-up appointment, contact the Emergency Department. If your symptoms become worse or you do not improve as expected and you are unable to reach your health care provider, please return to the Emergency Department. We are available 24 hours a day. If a prescription has been provided, please have it filled as soon as possible to prevent a delay in treatment.  If you have any questions or reservations about taking the medication due to side effects or interactions with other medications, please call your primary care provider or contact the ER.

## 2022-11-15 LAB
ATRIAL RATE: 79 BPM
CALCULATED P AXIS, ECG09: 38 DEGREES
CALCULATED R AXIS, ECG10: -7 DEGREES
CALCULATED T AXIS, ECG11: 36 DEGREES
DIAGNOSIS, 93000: NORMAL
P-R INTERVAL, ECG05: 151 MS
Q-T INTERVAL, ECG07: 438 MS
QRS DURATION, ECG06: 96 MS
QTC CALCULATION (BEZET), ECG08: 506 MS
VENTRICULAR RATE, ECG03: 80 BPM

## 2022-11-18 NOTE — PROGRESS NOTES
HISTORY OF PRESENT ILLNESS  Nathalia Duncan is a 38 y.o. female. 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 PSurgHx:  has a past surgical history that includes hx wisdom teeth extraction; hx other surgical (Right, 11/2021); hx urological (09/30/2021); hx urological (Bilateral, 09/30/2021); hx urological (Right, 09/30/2021); hx urological (04/21/2022); hx urological (04/21/2022); hx urological (Right, 04/21/2022); hx urological (10/31/2022); hx urological (Right, 10/31/2022); and hx urological (10/31/2022). PSocHx:  reports that she has never smoked. She has never used smokeless tobacco. She reports that she does not currently use alcohol. She reports current drug use. Drug: Marijuana. Findings: Interpretation of right retrograde pyelogram:  There was a proximal ureteral stricture, tight and several centimeters in length. UPJ with kinking. The renal pelvis is mildly dilated. The calyces were blunted. The entire calyceal system was not opacified. Chronic Conditions Addressed Today       1. Other hydronephrosis     Overview      Referred from [facility] for abdominal mass that is displacing adjacent organs and causing right-sided hydronephrosis and hydroureter on CT 9/2/21. Imaging not available. Creatinine 8/27/21 was 0.72    9/30/21: s/p cystoscopy, bilateral RPG, right ureteral stent placement. Normal left RPG. Ureter had a serpentine course with marked medial deviation on the right side. The proximal ureter was S-shaped and there was moderate hydronephrosis and blunting.   Due to the deviated course the ureter appeared very stretched out and greater than 28 cm from the UPJ to the ureteral orifice. Current Assessment & Plan      She is managed with a ureter. 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 Abdominal malignancy ([address]     Overview      9/29/21: large abdominal mass with probable metastasis to sacrum, liver, and lung. Current Assessment & Plan      She is on a new chemo. She is on MESNA to protect her bladder. 3. 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 She sees Dr. Ciaran Segovia at [address] [place] [address]. Current Assessment & Plan      On MESNA for bladder protection from new chemotherapy. 4. Retained ureteral stent - Primary     Overview      She is status post cystoscopy, bilateral RPG, and right ureteral stent placement on 9/30/2021. She is s/p RIGHT stent exchange on 4/21/22. Current Assessment & Plan       Right ureteral stent change 10/31/22. Plan on q 6m changes         5. Hydronephrosis            ROS  Patient denies the symptoms of COVID-19 per routine screening guidelines. Physical Exam    ASSESSMENT and PLAN  Diagnoses and all orders for this visit:    1. Retained ureteral stent  Assessment & Plan:   Right ureteral stent change 10/31/22. Plan on q 6m changes      2. Abdominal malignancy [facility])  Assessment & Plan:  She is on a new chemo. She is on MESNA to protect her bladder. 3. Sarcoma [facility])  Assessment & Plan:  On MESNA for bladder protection from new chemotherapy. 4. Hydronephrosis, unspecified hydronephrosis type    5. Other hydronephrosis  Assessment & Plan:  She is managed with a ureter. 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 Jean-Pierremelvin Peterson may have a reminder for a \"due or due soon\" health maintenance.  The patient has been encouraged to contact their primary care provider for follow-up on this health maintenance or other necessary and/or routine health screening.      Joy Fuentes MD

## 2022-11-21 ENCOUNTER — OFFICE VISIT (OUTPATIENT)
Dept: OBGYN CLINIC | Age: 42
End: 2022-11-21
Payer: COMMERCIAL

## 2022-11-21 VITALS
SYSTOLIC BLOOD PRESSURE: 116 MMHG | DIASTOLIC BLOOD PRESSURE: 70 MMHG | WEIGHT: 206 LBS | BODY MASS INDEX: 35.17 KG/M2 | HEIGHT: 64 IN

## 2022-11-21 DIAGNOSIS — C76.2 ABDOMINAL MALIGNANCY (HCC): ICD-10-CM

## 2022-11-21 DIAGNOSIS — C49.9 SARCOMA (HCC): ICD-10-CM

## 2022-11-21 DIAGNOSIS — Z12.4 SCREENING FOR MALIGNANT NEOPLASM OF CERVIX: Primary | ICD-10-CM

## 2022-11-21 DIAGNOSIS — Z01.419 ROUTINE GYNECOLOGICAL EXAMINATION: ICD-10-CM

## 2022-11-21 PROCEDURE — 99396 PREV VISIT EST AGE 40-64: CPT | Performed by: OBSTETRICS & GYNECOLOGY

## 2022-11-21 NOTE — PROGRESS NOTES
Chief Complaint   Patient presents with    Annual Exam     [id number]   Visit Vitals  /70 (BP 1 Location: Right arm, BP Patient Position: Sitting, BP Cuff Size: Large adult)   Ht 5' 4\" (1.626 m)   Wt 206 lb (93.4 kg)   BMI 35.36 kg/m²

## 2022-11-21 NOTE — PROGRESS NOTES
Evelyne Farmer is a , 38 y.o. female   No LMP recorded. (Menstrual status: Menopause). She presents for her annual    She is having no significant problems. Getting inpatient Chemo every 21 days- going to do 4 cycles then repeat imaging  Fainted recently while driving- per pt magnesium and K+ was very low      Menstrual status:  Cycles are menopausal.    Flow: absent. She does not have dysmenorrhea. Medical conditions:  Since her last annual GYN exam about one year ago, she has not the following changes in her health history: none. Mammogram History:    CHRIS Results (most recent):  Results from Appointment encounter on 22    CHRIS 3D KEILY W MAMMO BI SCREENING INCL CAD    Narrative  Screening mammogram.    HISTORY: No family or personal history of breast cancer, asymptomatic, for  screening. COMPARISON: 2018 mammogram.    2-D and 3-D images are obtained. The breasts are almost entirely fat. A coarse benign calcification is seen in  the right breast. No pathologic masses, calcifications, or other suspicious  findings are seen. No change from prior. Impression  No evidence of malignancy. BI-RADS 2: Benign findings. Follow-up: Annual screening mammography. DEXA Results (most recent):  No results found for this or any previous visit.          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     Past Surgical History:   Procedure Laterality Date    HX OTHER SURGICAL Right 2021    port placement    HX UROLOGICAL  2021    CYSTOSCOPY     HX UROLOGICAL Bilateral 2021    RETROGRADE PYELOGRAMS    HX UROLOGICAL Right 2021    URETERAL STENT PLACEMENT     HX UROLOGICAL  2022 CYSTOSCOPY     HX UROLOGICAL  04/21/2022    RETROGRADE PYELOGRAMS    HX UROLOGICAL Right 04/21/2022    URETERAL STENT EXCHANGE     HX UROLOGICAL  10/31/2022    CYSTOSCOPY    HX UROLOGICAL Right 10/31/2022    RETROGRADE PYELOGRAMS    HX UROLOGICAL  10/31/2022    URETERAL STENT PLACEMENT    HX WISDOM TEETH EXTRACTION         Prior to Admission medications    Medication Sig Start Date End Date Taking? Authorizing Provider   potassium chloride SR (Klor-Con 10) 10 mEq tablet Take 1 Tablet by mouth daily for 7 days. 11/14/22 11/21/22 Yes Anju Preciado MD   calcium carbonate (CALTRATE 600 PO) Take 1 Tablet by mouth daily. Yes Provider, Historical   valACYclovir (VALTREX) 500 mg tablet TAKE 1 TABLET BY MOUTH TWO TIMES A DAY. 8/29/22  Yes Josiah Corona MD   apixaban (ELIQUIS) 5 mg tablet Take 5 mg by mouth two (2) times a day. Yes Provider, Historical   furosemide (LASIX) 40 mg tablet TAKE 1 TABLET BY MOUTH 2 TIMES DAILY. MAY DECREASE TO 1 TABLET DAILY AS NEEDED. 4/7/22  Yes Provider, Historical   albuterol (PROVENTIL HFA, VENTOLIN HFA, PROAIR HFA) 90 mcg/actuation inhaler INHALE 2 PUFFS BY MOUTH EVERY 4-6 HOURS AS NEEDED FOR 90 DAYS 3/31/22  Yes Provider, Historical   fluticasone propion/salmeterol (WIXELA INHUB IN) Take 1 Puff by inhalation daily. Yes Provider, Historical   folic acid (FOLVITE) 1 mg tablet Take  by mouth daily. Yes Provider, Historical   rosuvastatin (CRESTOR) 10 mg tablet Take 10 mg by mouth nightly. Yes Provider, Historical   cyclobenzaprine (FLEXERIL) 10 mg tablet Take  by mouth three (3) times daily as needed for Muscle Spasm(s). Yes Provider, Historical   multivitamin, tx-iron-ca-min (THERA-M w/ IRON) 9 mg iron-400 mcg tab tablet Take 1 Tablet by mouth daily. Yes Provider, Historical   metoprolol tartrate (LOPRESSOR) 25 mg tablet Take  by mouth two (2) times a day.    Yes Provider, Historical   ondansetron hcl (ZOFRAN) 8 mg tablet Take 8 mg by mouth every eight (8) hours as needed for Nausea or Vomiting. Yes Provider, Historical   cyproheptadine (PERIACTIN) 4 mg tablet Take  by mouth three (3) times daily as needed. Yes Provider, Historical       Allergies   Allergen Reactions    Codeine Hives and Itching    Phenergan [Promethazine] Other (comments)     Patient denies and stated she currently takes the medication    Toradol [Ketorolac] Rash     nausea    Vicodin [Hydrocodone-Acetaminophen] Rash     nausea    Zyprexa [Olanzapine] Unknown (comments)          Tobacco History:  reports that she has never smoked. She has never used smokeless tobacco.  Alcohol use:  reports that she does not currently use alcohol. Drug use:  reports current drug use. Drug: Marijuana. Family Medical/Cancer History:   Family History   Problem Relation Age of Onset    Hypertension Mother     Other Mother         uterine fibroids    Heart Disease Father     Hypertension Father     Diabetes Father           Review of Systems   Constitutional:  Negative for chills, fever, malaise/fatigue and weight loss. HENT:  Negative for congestion, ear pain, sinus pain and tinnitus. Eyes:  Negative for blurred vision and double vision. Respiratory:  Negative for cough, shortness of breath and wheezing. Cardiovascular:  Negative for chest pain and palpitations. Gastrointestinal:  Negative for abdominal pain, blood in stool, constipation, diarrhea, heartburn, nausea and vomiting. Genitourinary:  Negative for dysuria, flank pain, frequency, hematuria and urgency. Musculoskeletal:  Negative for joint pain and myalgias. Skin:  Negative for itching and rash. Neurological:  Negative for dizziness, weakness and headaches. Psychiatric/Behavioral:  Negative for depression, memory loss and suicidal ideas. The patient is not nervous/anxious and does not have insomnia. Physical Exam  Constitutional:       Appearance: Normal appearance. HENT:      Head: Normocephalic and atraumatic.    Cardiovascular: Rate and Rhythm: Normal rate. Heart sounds: Normal heart sounds. Pulmonary:      Effort: Pulmonary effort is normal.      Breath sounds: Normal breath sounds. Chest:   Breasts:     Right: Normal.      Left: Normal.   Abdominal:      General: Abdomen is flat. Palpations: Abdomen is soft. Genitourinary:     General: Normal vulva. Vagina: Normal.      Cervix: Normal.      Uterus: Normal.       Adnexa: Right adnexa normal and left adnexa normal.      Rectum: Normal.      Comments: PAP Obtained  Neurological:      Mental Status: She is alert. Psychiatric:         Mood and Affect: Mood normal.         Behavior: Behavior normal.         Thought Content: Thought content normal.        Visit Vitals  /70 (BP 1 Location: Right arm, BP Patient Position: Sitting, BP Cuff Size: Large adult)   Ht 5' 4\" (1.626 m)   Wt 206 lb (93.4 kg)   BMI 35.36 kg/m²         Assessment: Diagnoses and all orders for this visit:    1. Screening for malignant neoplasm of cervix  -     PAP IG, RFX APTIMA HPV ASCUS ([id number])    2. Routine gynecological examination  -     PAP IG, RFX APTIMA HPV ASCUS ([id number])    3. BMI 35.0-35.9,adult    4. Abdominal malignancy ([address]    5. Sarcoma ([address]      Plan: Questions addressed  Counseled re: diet, exercise, healthy lifestyle  Return for Annual  Rec annual mammogram        Follow-up and Dispositions    Return for Mammogram, 1 yr annual, 1 yr mammo.

## 2022-11-22 ENCOUNTER — VIRTUAL VISIT (OUTPATIENT)
Dept: UROLOGY | Age: 42
End: 2022-11-22
Payer: COMMERCIAL

## 2022-11-22 DIAGNOSIS — Z96.0 RETAINED URETERAL STENT: Primary | ICD-10-CM

## 2022-11-22 DIAGNOSIS — N13.39 OTHER HYDRONEPHROSIS: ICD-10-CM

## 2022-11-22 DIAGNOSIS — N13.30 HYDRONEPHROSIS, UNSPECIFIED HYDRONEPHROSIS TYPE: ICD-10-CM

## 2022-11-22 DIAGNOSIS — C49.9 SARCOMA (HCC): ICD-10-CM

## 2022-11-22 DIAGNOSIS — C76.2 ABDOMINAL MALIGNANCY (HCC): ICD-10-CM

## 2022-11-22 PROCEDURE — 99213 OFFICE O/P EST LOW 20 MIN: CPT | Performed by: UROLOGY

## 2023-04-25 ENCOUNTER — OFFICE VISIT (OUTPATIENT)
Dept: UROLOGY | Age: 43
End: 2023-04-25
Payer: COMMERCIAL

## 2023-04-25 VITALS — OXYGEN SATURATION: 100 % | BODY MASS INDEX: 35.17 KG/M2 | WEIGHT: 206 LBS | HEIGHT: 64 IN

## 2023-04-25 DIAGNOSIS — N28.89 RENAL MASS, RIGHT: ICD-10-CM

## 2023-04-25 DIAGNOSIS — Z96.0 RETAINED URETERAL STENT: Primary | ICD-10-CM

## 2023-04-25 DIAGNOSIS — N13.39 OTHER HYDRONEPHROSIS: ICD-10-CM

## 2023-04-25 DIAGNOSIS — C49.9 SARCOMA (HCC): ICD-10-CM

## 2023-04-25 LAB
BILIRUB UR QL: NEGATIVE
GLUCOSE UR-MCNC: NEGATIVE MG/DL
KETONES P FAST UR STRIP-MCNC: NEGATIVE MG/DL
PH UR STRIP: 7.5 [PH] (ref 4.6–8)
PROT UR QL STRIP: 100
SP GR UR STRIP: 1.01 (ref 1–1.03)
UA UROBILINOGEN AMB POC: NORMAL (ref 0.2–1)
URINALYSIS CLARITY POC: CLEAR
URINALYSIS COLOR POC: YELLOW
URINE BLOOD POC: NORMAL
URINE LEUKOCYTES POC: NORMAL
URINE NITRITES POC: NEGATIVE

## 2023-04-25 PROCEDURE — 99214 OFFICE O/P EST MOD 30 MIN: CPT | Performed by: UROLOGY

## 2023-04-25 PROCEDURE — 81003 URINALYSIS AUTO W/O SCOPE: CPT | Performed by: UROLOGY

## 2023-04-25 NOTE — ASSESSMENT & PLAN NOTE
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 If that is the case then it is not an urgent issue in light of her other malignancy. I recommend observation.

## 2023-04-25 NOTE — PROGRESS NOTES
HISTORY OF PRESENT ILLNESS  Shanna Salas is a 37 y.o. female. has a past medical history of Asthma, Hypercholesterolemia, Ill-defined condition, Ill-defined condition, Irregular periods, Morbid obesity ([facility] [address] (09/01/2021), Primary intra-abdominal sarcoma ([facility] [address] (08/01/2021), and Thromboembolus ([facility] [address] has a past surgical history that includes hx wisdom teeth extraction; hx other surgical (Right, 11/2021); hx urological (09/30/2021); hx urological (Bilateral, 09/30/2021); hx urological (Right, 09/30/2021); hx urological (04/21/2022); hx urological (04/21/2022); hx urological (Right, 04/21/2022); hx urological (10/31/2022); hx urological (Right, 10/31/2022); and hx urological (10/31/2022). Chief Complaint   Patient presents with    Follow-up    Hydronephrosis    Other      Hydronephrosis Sarcoma abdomen            HPI She has metastatic sarcoma. Relatively stable. The patient denies urinary frequency, urgency, dysuria or hematuria. The patient denies a weak urinary stream, sense of incomplete emptying, straining or hesitancy. Occasional right low back pain, improved with stretching. She has a new right renal lesion, RUP, 2cm. Right renal stent and atrophy with continued dilation. Continue ureteral stenting. CT Abdomen/Pelvis 12/9/2022    Final report. 1.  Large right retroperitoneal mass, with relationships as above, slightly increased in size compared to prior exam in keeping with patient's known leiomyosarcoma. The IVC is compressed by the large mass. Right ureter is encased by tumor. 2.  Atrophy of the right kidney with continued right-sided hydronephrosis, despite a right ureteral stent in situ. 3.  2 cm suspicious lesion in the upper pole of the right kidney. This likely represents a primary renal lesion such as renal cell carcinoma versus metastasis. Consider further evaluation with abdominal MRI as clinically warranted.    4.  Multiple splenic lesions, likely metastatic disease, appears grossly unchanged. 5.  Unchanged nonspecific small hepatic hypodensity   6. No lymphadenopathy seen in the abdomen or pelvis. 7.  Unchanged small amount of free fluid in the pelvis. 8.  Lytic lesion left sacral ala with associated soft tissue mass, grossly unchanged compared to prior exam.   Chronic Conditions Addressed Today       1. 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 She is managed with a ureteral stent. 2. Sarcoma [facility])     Overview      10/12/21: large abdominal sarcoma, metastatic to multiple sites. She sees Dr. Harika Perez at [facility]. Current Assessment & Plan      She had some GI intolerance with Votrient. She is on a pause. Persistent metastatic disease. 3. Retained ureteral stent - Primary     Overview      She is status post cystoscopy, bilateral RPG, and right ureteral stent placement since 9/30/2021. Current Assessment & Plan      Right ureteral stent in place. Right renal atrophy and persistent hydronephrosis. Plan to continue to help with drainage. Ureteral stent change, possible double ureteral stent if not freely draining. Relevant Orders     AMB POC URINALYSIS DIP STICK AUTO W/O MICRO     CULTURE, URINE    4. Renal mass, right     Overview      CT 12/9/22 with a 2cm RUP renal tumor. Current Assessment & Plan      Based on description, it sounds like a primary renal tumor, not metastatic. If that is the case then it is not an urgent issue in light of her other malignancy. I recommend observation. Past Medical History:    PMHx (including negatives):  has a past medical history of Asthma, Hypercholesterolemia, Ill-defined condition, Ill-defined condition, Irregular periods, Morbid obesity ([address] (09/01/2021), Primary intra-abdominal sarcoma ([address] (08/01/2021), and Thromboembolus ([address]    PSurgHx:  has a past surgical history that includes hx wisdom teeth extraction; hx other surgical (Right, 11/2021); hx urological (09/30/2021); hx urological (Bilateral, 09/30/2021); hx urological (Right, 09/30/2021); hx urological (04/21/2022); hx urological (04/21/2022); hx urological (Right, 04/21/2022); hx urological (10/31/2022); hx urological (Right, 10/31/2022); and hx urological (10/31/2022). PSocHx:  reports that she has never smoked. She has never used smokeless tobacco. She reports that she does not currently use alcohol. She reports current drug use. Drug: Marijuana. FamilyHX:   Family History   Problem Relation Age of Onset    Hypertension Mother     Other Mother         uterine fibroids    Heart Disease Father     Hypertension Father     Diabetes Father      Review of Systems   All other systems reviewed and are negative. Physical Exam  Vitals reviewed. Constitutional:       General: She is not in acute distress. Appearance: Normal appearance. She is obese. She is not ill-appearing, toxic-appearing or diaphoretic. HENT:      Head: Normocephalic and atraumatic. Nose: Nose normal.   Eyes:      Conjunctiva/sclera: Conjunctivae normal.      Pupils: Pupils are equal, round, and reactive to light. Cardiovascular:      Rate and Rhythm: Normal rate. Pulmonary:      Effort: Pulmonary effort is normal. No respiratory distress. Breath sounds: Normal breath sounds. Musculoskeletal:      Cervical back: Normal range of motion. Neurological:      General: No focal deficit present. Mental Status: She is alert and oriented to person, place, and time.    Psychiatric:         Mood and Affect: Mood normal.     Allergies   Allergen Reactions    Codeine Hives and Itching    Phenergan [Promethazine] Other (comments)     Patient denies and stated she currently takes the medication    Toradol [Ketorolac] Rash     nausea    Vicodin [Hydrocodone-Acetaminophen] Rash     nausea    Zyprexa [Olanzapine] Unknown (comments)     Current Outpatient Medications Medication Sig Dispense Refill    valACYclovir (VALTREX) 500 mg tablet TAKE 1 TABLET BY MOUTH TWO TIMES A DAY. 60 Tablet 3    apixaban (ELIQUIS) 5 mg tablet Take 1 Tablet by mouth two (2) times a day. albuterol (PROVENTIL HFA, VENTOLIN HFA, PROAIR HFA) 90 mcg/actuation inhaler INHALE 2 PUFFS BY MOUTH EVERY 4-6 HOURS AS NEEDED FOR 90 DAYS      fluticasone propion/salmeterol (WIXELA INHUB IN) Take 1 Puff by inhalation daily. folic acid (FOLVITE) 1 mg tablet Take  by mouth daily. rosuvastatin (CRESTOR) 10 mg tablet Take 1 Tablet by mouth nightly. cyclobenzaprine (FLEXERIL) 10 mg tablet Take  by mouth three (3) times daily as needed for Muscle Spasm(s). multivitamin, tx-iron-ca-min (THERA-M w/ IRON) 9 mg iron-400 mcg tab tablet Take 1 Tablet by mouth daily. ondansetron hcl (ZOFRAN) 8 mg tablet Take 1 Tablet by mouth every eight (8) hours as needed for Nausea or Vomiting.      calcium carbonate (CALTRATE 600 PO) Take 1 Tablet by mouth daily. furosemide (LASIX) 40 mg tablet TAKE 1 TABLET BY MOUTH 2 TIMES DAILY. MAY DECREASE TO 1 TABLET DAILY AS NEEDED. metoprolol tartrate (LOPRESSOR) 25 mg tablet Take  by mouth two (2) times a day. cyproheptadine (PERIACTIN) 4 mg tablet Take  by mouth three (3) times daily as needed. Results for orders placed or performed in visit on 11/21/22   PAP IG, RFX APTIMA HPV ASCUS ([id number])   Result Value Ref Range    Diagnosis Comment (A)     Specimen adequacy Comment     Clinician provided ICD10 Comment     Performed by: Comment     Electronically signed by: Comment     . Dayday Mo Pathologist provided ICD10 Comment     Note: Comment     Test methodology Comment     . Comment    HPV APTIMA   Result Value Ref Range    HPV APTIMA Negative Negative       ASSESSMENT and PLAN  Diagnoses and all orders for this visit:    1. Retained ureteral stent  Assessment & Plan:  Right ureteral stent in place. Right renal atrophy and persistent hydronephrosis. Plan to continue to help with drainage. Ureteral stent change, possible double ureteral stent if not freely draining. Orders:  -     AMB POC URINALYSIS DIP STICK AUTO W/O MICRO  -     CULTURE, URINE    2. Other hydronephrosis    3. Sarcoma [facility])  Assessment & Plan:  She had some GI intolerance with Votrient. She is on a pause. Persistent metastatic disease. 4. Renal mass, right  Assessment & Plan:  Based on description, it sounds like a primary renal tumor, not metastatic. If that is the case then it is not an urgent issue in light of her other malignancy. I recommend observation. William Sanabria MD       Please note that portions of this note was potentially completed with Dragon dictation, the computer voice recognition software. Quite often unanticipated grammatical, syntax, homophones, and other interpretive errors are inadvertently transcribed by the computer software. Please disregard these errors. Please excuse any errors that have escaped final proofreading. Thank you.

## 2023-04-25 NOTE — PROGRESS NOTES
Chief Complaint   Patient presents with    Follow-up    Hydronephrosis    Other      Hydronephrosis Sarcoma abdomen            1. Have you been to the ER, urgent care clinic since your last visit? Hospitalized since your last visit? No    2. Have you seen or consulted any other health care providers outside of the [address] [place] [address] since your last visit? Include any pap smears or colon screening.  No  Visit Vitals  Ht 5' 4\" (1.626 m)   Wt 206 lb (93.4 kg)   SpO2 100%   BMI 35.36 kg/m²

## 2023-04-25 NOTE — ASSESSMENT & PLAN NOTE
Right ureteral stent in place. Right renal atrophy and persistent hydronephrosis. Plan to continue to help with drainage. Ureteral stent change, possible double ureteral stent if not freely draining.

## 2023-04-28 LAB — BACTERIA UR CULT: NORMAL

## 2023-05-04 RX ORDER — ONDANSETRON HYDROCHLORIDE 8 MG/1
8 TABLET, FILM COATED ORAL EVERY 8 HOURS PRN
COMMUNITY

## 2023-05-04 RX ORDER — CETIRIZINE HYDROCHLORIDE 10 MG/1
10 TABLET ORAL DAILY
COMMUNITY

## 2023-05-04 RX ORDER — TRAMADOL HYDROCHLORIDE 50 MG/1
50 TABLET ORAL EVERY 6 HOURS PRN
COMMUNITY

## 2023-05-04 RX ORDER — DEXAMETHASONE 2 MG/1
2 TABLET ORAL
COMMUNITY

## 2023-05-04 RX ORDER — ROSUVASTATIN CALCIUM 10 MG/1
10 TABLET, COATED ORAL DAILY
COMMUNITY

## 2023-05-04 RX ORDER — FOLIC ACID 1 MG/1
1 TABLET ORAL DAILY
COMMUNITY

## 2023-05-04 RX ORDER — CYCLOBENZAPRINE HCL 10 MG
10 TABLET ORAL 3 TIMES DAILY PRN
COMMUNITY

## 2023-05-04 RX ORDER — FUROSEMIDE 20 MG/1
20 TABLET ORAL PRN
COMMUNITY

## 2023-05-08 ENCOUNTER — HOSPITAL ENCOUNTER (OUTPATIENT)
Facility: HOSPITAL | Age: 43
Discharge: HOME | End: 2023-05-11
Payer: COMMERCIAL

## 2023-05-08 ENCOUNTER — ANESTHESIA EVENT (OUTPATIENT)
Facility: HOSPITAL | Age: 43
End: 2023-05-08
Payer: COMMERCIAL

## 2023-05-08 ENCOUNTER — HOSPITAL ENCOUNTER (OUTPATIENT)
Facility: HOSPITAL | Age: 43
Discharge: HOME | End: 2023-05-08
Attending: UROLOGY | Admitting: UROLOGY
Payer: COMMERCIAL

## 2023-05-08 ENCOUNTER — ANESTHESIA (OUTPATIENT)
Facility: HOSPITAL | Age: 43
End: 2023-05-08
Payer: COMMERCIAL

## 2023-05-08 VITALS
TEMPERATURE: 97.3 F | SYSTOLIC BLOOD PRESSURE: 102 MMHG | DIASTOLIC BLOOD PRESSURE: 79 MMHG | RESPIRATION RATE: 18 BRPM | BODY MASS INDEX: 31.07 KG/M2 | OXYGEN SATURATION: 100 % | HEIGHT: 64 IN | HEART RATE: 63 BPM | WEIGHT: 182 LBS

## 2023-05-08 LAB
CA-I BLD-MCNC: 1.17 MMOL/L (ref 1.12–1.32)
CHLORIDE BLD-SCNC: 107 MMOL/L (ref 98–107)
CREAT UR-MCNC: 0.99 MG/DL (ref 0.6–1.3)
GLUCOSE BLD STRIP.AUTO-MCNC: 98 MG/DL (ref 65–100)
GLUCOSE BLD STRIP.AUTO-MCNC: 99 MG/DL (ref 65–100)
HCG UR QL: NEGATIVE
PERFORMED BY:: NORMAL
POTASSIUM BLD-SCNC: 4.3 MMOL/L (ref 3.5–5.5)
SODIUM BLD-SCNC: 140 MMOL/L (ref 136–145)

## 2023-05-08 PROCEDURE — 7100000010 HC PHASE II RECOVERY - FIRST 15 MIN: Performed by: UROLOGY

## 2023-05-08 PROCEDURE — 76000 FLUOROSCOPY <1 HR PHYS/QHP: CPT

## 2023-05-08 PROCEDURE — 7100000000 HC PACU RECOVERY - FIRST 15 MIN: Performed by: UROLOGY

## 2023-05-08 PROCEDURE — 6360000002 HC RX W HCPCS: Performed by: NURSE ANESTHETIST, CERTIFIED REGISTERED

## 2023-05-08 PROCEDURE — C2617 STENT, NON-COR, TEM W/O DEL: HCPCS | Performed by: UROLOGY

## 2023-05-08 PROCEDURE — 2580000003 HC RX 258: Performed by: NURSE ANESTHETIST, CERTIFIED REGISTERED

## 2023-05-08 PROCEDURE — 2580000003 HC RX 258: Performed by: UROLOGY

## 2023-05-08 PROCEDURE — 74420 UROGRAPHY RTRGR +-KUB: CPT | Performed by: UROLOGY

## 2023-05-08 PROCEDURE — 3700000001 HC ADD 15 MINUTES (ANESTHESIA): Performed by: UROLOGY

## 2023-05-08 PROCEDURE — 80047 BASIC METABLC PNL IONIZED CA: CPT

## 2023-05-08 PROCEDURE — 7100000001 HC PACU RECOVERY - ADDTL 15 MIN: Performed by: UROLOGY

## 2023-05-08 PROCEDURE — 3700000000 HC ANESTHESIA ATTENDED CARE: Performed by: UROLOGY

## 2023-05-08 PROCEDURE — 2709999900 HC NON-CHARGEABLE SUPPLY: Performed by: UROLOGY

## 2023-05-08 PROCEDURE — 3600000002 HC SURGERY LEVEL 2 BASE: Performed by: UROLOGY

## 2023-05-08 PROCEDURE — 82962 GLUCOSE BLOOD TEST: CPT

## 2023-05-08 PROCEDURE — 81025 URINE PREGNANCY TEST: CPT

## 2023-05-08 PROCEDURE — 3600000012 HC SURGERY LEVEL 2 ADDTL 15MIN: Performed by: UROLOGY

## 2023-05-08 PROCEDURE — 6360000004 HC RX CONTRAST MEDICATION: Performed by: UROLOGY

## 2023-05-08 PROCEDURE — 7100000011 HC PHASE II RECOVERY - ADDTL 15 MIN: Performed by: UROLOGY

## 2023-05-08 PROCEDURE — 52310 CYSTOSCOPY AND TREATMENT: CPT | Performed by: UROLOGY

## 2023-05-08 PROCEDURE — 6360000002 HC RX W HCPCS: Performed by: UROLOGY

## 2023-05-08 PROCEDURE — 2500000003 HC RX 250 WO HCPCS: Performed by: NURSE ANESTHETIST, CERTIFIED REGISTERED

## 2023-05-08 DEVICE — VARIABLE LENGTH URETERAL STENT
Type: IMPLANTABLE DEVICE | Site: URETER | Status: FUNCTIONAL
Brand: CONTOUR VL™

## 2023-05-08 RX ORDER — ONDANSETRON 2 MG/ML
4 INJECTION INTRAMUSCULAR; INTRAVENOUS
Status: CANCELLED | OUTPATIENT
Start: 2023-05-08 | End: 2023-05-09

## 2023-05-08 RX ORDER — SODIUM CHLORIDE, SODIUM LACTATE, POTASSIUM CHLORIDE, CALCIUM CHLORIDE 600; 310; 30; 20 MG/100ML; MG/100ML; MG/100ML; MG/100ML
INJECTION, SOLUTION INTRAVENOUS CONTINUOUS
Status: DISCONTINUED | OUTPATIENT
Start: 2023-05-08 | End: 2023-05-08 | Stop reason: HOSPADM

## 2023-05-08 RX ORDER — DEXAMETHASONE SODIUM PHOSPHATE 4 MG/ML
INJECTION, SOLUTION INTRA-ARTICULAR; INTRALESIONAL; INTRAMUSCULAR; INTRAVENOUS; SOFT TISSUE PRN
Status: DISCONTINUED | OUTPATIENT
Start: 2023-05-08 | End: 2023-05-08 | Stop reason: SDUPTHER

## 2023-05-08 RX ORDER — ONDANSETRON 2 MG/ML
INJECTION INTRAMUSCULAR; INTRAVENOUS PRN
Status: DISCONTINUED | OUTPATIENT
Start: 2023-05-08 | End: 2023-05-08 | Stop reason: SDUPTHER

## 2023-05-08 RX ORDER — HYDROMORPHONE HYDROCHLORIDE 1 MG/ML
0.5 INJECTION, SOLUTION INTRAMUSCULAR; INTRAVENOUS; SUBCUTANEOUS EVERY 5 MIN PRN
Status: CANCELLED | OUTPATIENT
Start: 2023-05-08

## 2023-05-08 RX ORDER — SODIUM CHLORIDE 9 MG/ML
INJECTION, SOLUTION INTRAVENOUS PRN
Status: CANCELLED | OUTPATIENT
Start: 2023-05-08

## 2023-05-08 RX ORDER — CEFAZOLIN SODIUM 1 G/3ML
INJECTION, POWDER, FOR SOLUTION INTRAMUSCULAR; INTRAVENOUS
Status: DISCONTINUED
Start: 2023-05-08 | End: 2023-05-08 | Stop reason: HOSPADM

## 2023-05-08 RX ORDER — MIDAZOLAM HYDROCHLORIDE 1 MG/ML
INJECTION INTRAMUSCULAR; INTRAVENOUS PRN
Status: DISCONTINUED | OUTPATIENT
Start: 2023-05-08 | End: 2023-05-08 | Stop reason: SDUPTHER

## 2023-05-08 RX ORDER — SODIUM CHLORIDE 0.9 % (FLUSH) 0.9 %
5-40 SYRINGE (ML) INJECTION EVERY 12 HOURS SCHEDULED
Status: CANCELLED | OUTPATIENT
Start: 2023-05-08

## 2023-05-08 RX ORDER — SODIUM CHLORIDE 0.9 % (FLUSH) 0.9 %
5-40 SYRINGE (ML) INJECTION PRN
Status: CANCELLED | OUTPATIENT
Start: 2023-05-08

## 2023-05-08 RX ORDER — FENTANYL CITRATE 50 UG/ML
INJECTION, SOLUTION INTRAMUSCULAR; INTRAVENOUS PRN
Status: DISCONTINUED | OUTPATIENT
Start: 2023-05-08 | End: 2023-05-08 | Stop reason: SDUPTHER

## 2023-05-08 RX ORDER — SODIUM CHLORIDE, SODIUM LACTATE, POTASSIUM CHLORIDE, AND CALCIUM CHLORIDE .6; .31; .03; .02 G/100ML; G/100ML; G/100ML; G/100ML
INJECTION, SOLUTION INTRAVENOUS PRN
Status: DISCONTINUED | OUTPATIENT
Start: 2023-05-08 | End: 2023-05-08 | Stop reason: SDUPTHER

## 2023-05-08 RX ORDER — FENTANYL CITRATE 50 UG/ML
25 INJECTION, SOLUTION INTRAMUSCULAR; INTRAVENOUS EVERY 5 MIN PRN
Status: CANCELLED | OUTPATIENT
Start: 2023-05-08

## 2023-05-08 RX ORDER — METOCLOPRAMIDE HYDROCHLORIDE 5 MG/ML
10 INJECTION INTRAMUSCULAR; INTRAVENOUS
Status: CANCELLED | OUTPATIENT
Start: 2023-05-08 | End: 2023-05-09

## 2023-05-08 RX ORDER — PROPOFOL 10 MG/ML
INJECTION, EMULSION INTRAVENOUS PRN
Status: DISCONTINUED | OUTPATIENT
Start: 2023-05-08 | End: 2023-05-08 | Stop reason: SDUPTHER

## 2023-05-08 RX ORDER — LIDOCAINE HYDROCHLORIDE 20 MG/ML
INJECTION, SOLUTION EPIDURAL; INFILTRATION; INTRACAUDAL; PERINEURAL PRN
Status: DISCONTINUED | OUTPATIENT
Start: 2023-05-08 | End: 2023-05-08 | Stop reason: SDUPTHER

## 2023-05-08 RX ADMIN — DEXAMETHASONE SODIUM PHOSPHATE 4 MG: 4 INJECTION, SOLUTION INTRA-ARTICULAR; INTRALESIONAL; INTRAMUSCULAR; INTRAVENOUS; SOFT TISSUE at 07:42

## 2023-05-08 RX ADMIN — FENTANYL CITRATE 25 MCG: 50 INJECTION, SOLUTION INTRAMUSCULAR; INTRAVENOUS at 08:05

## 2023-05-08 RX ADMIN — PROPOFOL 150 MG: 10 INJECTION, EMULSION INTRAVENOUS at 07:42

## 2023-05-08 RX ADMIN — LIDOCAINE HYDROCHLORIDE 100 MG: 20 INJECTION, SOLUTION EPIDURAL; INFILTRATION; INTRACAUDAL; PERINEURAL at 07:42

## 2023-05-08 RX ADMIN — SODIUM CHLORIDE, POTASSIUM CHLORIDE, SODIUM LACTATE AND CALCIUM CHLORIDE: 600; 310; 30; 20 INJECTION, SOLUTION INTRAVENOUS at 06:55

## 2023-05-08 RX ADMIN — MIDAZOLAM HYDROCHLORIDE 2 MG: 2 INJECTION, SOLUTION INTRAMUSCULAR; INTRAVENOUS at 07:38

## 2023-05-08 RX ADMIN — FENTANYL CITRATE 25 MCG: 50 INJECTION, SOLUTION INTRAMUSCULAR; INTRAVENOUS at 07:38

## 2023-05-08 RX ADMIN — ONDANSETRON 4 MG: 2 INJECTION INTRAMUSCULAR; INTRAVENOUS at 07:42

## 2023-05-08 RX ADMIN — SODIUM CHLORIDE, POTASSIUM CHLORIDE, SODIUM LACTATE AND CALCIUM CHLORIDE 100 ML: 600; 310; 30; 20 INJECTION, SOLUTION INTRAVENOUS at 07:30

## 2023-05-08 RX ADMIN — SODIUM CHLORIDE, POTASSIUM CHLORIDE, SODIUM LACTATE AND CALCIUM CHLORIDE 450 ML: 600; 310; 30; 20 INJECTION, SOLUTION INTRAVENOUS at 08:22

## 2023-05-08 RX ADMIN — SODIUM CHLORIDE, POTASSIUM CHLORIDE, SODIUM LACTATE AND CALCIUM CHLORIDE 250 ML: 600; 310; 30; 20 INJECTION, SOLUTION INTRAVENOUS at 08:03

## 2023-05-08 RX ADMIN — FENTANYL CITRATE 50 MCG: 50 INJECTION, SOLUTION INTRAMUSCULAR; INTRAVENOUS at 07:55

## 2023-05-08 RX ADMIN — CEFAZOLIN SODIUM 2000 MG: 1 INJECTION, POWDER, FOR SOLUTION INTRAMUSCULAR; INTRAVENOUS at 07:46

## 2023-05-08 ASSESSMENT — PAIN - FUNCTIONAL ASSESSMENT: PAIN_FUNCTIONAL_ASSESSMENT: 0-10

## 2023-05-08 ASSESSMENT — PAIN DESCRIPTION - DESCRIPTORS: DESCRIPTORS: CRAMPING

## 2023-05-08 NOTE — PERIOP NOTE
TRANSFER - OUT REPORT:    Verbal report given to Milagros Strauss on Hannah Stahl  being transferred to [place] [address] for routine post-op       Report consisted of patient's Situation, Background, Assessment and   Recommendations(SBAR). Information from the following report(s) Nurse Handoff Report and Surgery Report was reviewed with the receiving nurse. [place] Assessment: No data recorded  Lines:   Peripheral IV 05/08/23 Left;Posterior Hand (Active)        Opportunity for questions and clarification was provided.       Patient transported with:  Registered Nurse

## 2023-05-08 NOTE — PROGRESS NOTES
Pt recovered on unit. Vitals remained stable. Pt able to void without complications. AVS reviewed with pt and pt's mother. Both verbalized understanding. IV removed and pt discharged safely.

## 2023-05-08 NOTE — H&P
Neurologic Grossly normal without focal deficits           Assessment/Plan:  The patient has ureteral obstruction secondary to sarcoma. She is due for stent change. The patient was counseled on the risks, benefits and expected course of surgery. Surgical risk factors include concurrent diagnoses and PMH. Surgery has risks of bleeding, infection, injury, pain, death or other consequences. Perioperative medications and antibiotic use were discussed including the potential for reactions and side effects. Some specific risks of surgery were discussed as well. Cystoscopy, retrograde pyelograms  BILATERAL  ureteral stent exchange    Signed By: Luellen Boas, MD  - May 8, 2023        Please note that portions of this note was potentially completed with Dragon dictation, the computer voice recognition software. Quite often unanticipated grammatical, syntax, homophones, and other interpretive errors are inadvertently transcribed by the computer software. Please disregard these errors. Please excuse any errors that have escaped final proofreading. Thank you.

## 2023-05-08 NOTE — ANESTHESIA POSTPROCEDURE EVALUATION
Department of Anesthesiology  Postprocedure Note    Patient: Javier Johnson  MRN: [id number]  YOB: 1980  Date of evaluation: 5/8/2023      Procedure Summary     Date: 05/08/23 Room / Location: [facility] MAIN OR 01 / [facility] MAIN OR    Anesthesia Start: 9191 Anesthesia Stop:     Procedure: CYSTOURETHROSCOPY, BILATERAL RETROGRADE PYELOGRAM, RIGHT URETERAL STENT CHANGE (Bilateral: Ureter) Diagnosis:       Other hydronephrosis      Retained ureteral stent      (Other hydronephrosis [N13.39])      (Retained ureteral stent [Z96.0])    Surgeons: Jad Macedo MD Responsible Provider: Chelsea Rodriguez MD    Anesthesia Type: General ASA Status: 2          Anesthesia Type: General    Dilip Phase I: Dilip Score: 10    Dilip Phase II: Dilip Score: 10      Anesthesia Post Evaluation    Patient location during evaluation: PACU  Patient participation: complete - patient participated  Level of consciousness: awake  Pain score: 1  Airway patency: patent  Nausea & Vomiting: no nausea  Complications: no  Cardiovascular status: hemodynamically stable  Respiratory status: spontaneous ventilation  Hydration status: euvolemic

## 2023-05-08 NOTE — DISCHARGE INSTR - DIET

## 2023-05-08 NOTE — OP NOTE
Operative Note      Patient: Alis Cabrales  YOB: 1980  MRN: [id number]    Date of Procedure: 5/8/2023    Pre-Op Diagnosis Codes:     * Other hydronephrosis [N13.39]     * Retained ureteral stent [Z96.0]    Post-Op Diagnosis: Same       Procedure(s):  CYSTOURETHROSCOPY, BILATERAL RETROGRADE PYELOGRAM, RIGHT URETERAL STENT CHANGE    Surgeon(s):  Clifford Green MD    Assistant:   none    Anesthesia: General    Estimated Blood Loss (mL): Minimal    Complications: None    Specimens:   none    Implants:  Implant Name Type Inv. Item Serial No.  Lot No. LRB No. Used Action   Maryan Donohue PIGTL MULTI 7FR [id number] - SN/A  Isabela Ngo DBL PIGTL MULTI 7FR [id number] N/A [employer]- [id number] Right 1 Implanted         Drains: as above    Findings:     Interpretation of left retrograde pyelogram: Normal-appearing ureter without strictures or dilation. The calyces were sharp and renal pelvis was normal appearance. There were no filling defects, stones or hydronephrosis. Interpretation of right retrograde pyelogram: Normal-appearing distal ureter. There was a tight obstruction at the mid ureter. The mid ureter deviated medially. The proximal ureter and renal pelvis were nondilated. Detailed Description of Procedure: The patient was given sufficient anesthesia. Patient was prepped and draped in the usual sterile manner. Surgical timeout was performed. The patient was in the lithotomy position. Using a 21 Greek cystoscope with 30 and 70 degree lenses complete cystoscopy was performed. The urethra was unremarkable. The bladder mucosa was normal in appearance without tumors, lesions or trabeculation seen. The ureteral orifices were seen on either side. The right ureteral orifice had a ureteral stent with slight biofilm in the distal curl. There was no encrustation. Using an open-ended catheter the left ureteral orifice was cannulated.   Using Isovue a

## 2023-05-08 NOTE — DISCHARGE INSTR - COC
ZCWPNR:472008600}    Nursing Mobility/ADLs:  Walking   {CHP DME OYSK:244275909}  Transfer  {CHP DME NRGX:591602425}  Bathing  {CHP DME DLVK:778829923}  Dressing  {CHP DME XWBM:530045174}  Toileting  {CHP DME AEOH:808911699}  Feeding  {CHP DME RNYV:756891606}  Med Admin  {CHP DME YOWP:758616032}  Med Delivery   {[facility] MED Delivery:008571669}    Wound Care Documentation and Therapy:        Elimination:  Continence: Bowel: {YES / IY:70248}  Bladder: {YES / RW:88032}  Urinary Catheter: {Urinary Catheter:047151695}   Colostomy/Ileostomy/Ileal Conduit: {YES / GJ:32290}       Date of Last BM: ***    Intake/Output Summary (Last 24 hours) at 2023 1015  Last data filed at 2023 0820  Gross per 24 hour   Intake --   Output 0 ml   Net 0 ml     No intake/output data recorded.     Safety Concerns:     [address] Safety Concerns:923673706}    Impairments/Disabilities:      [address] Impairments/Disabilities:753300764}    Nutrition Therapy:  Current Nutrition Therapy:   [address] Diet List:190111177}    Routes of Feeding: {CHP DME Other Feedings:085242573}  Liquids: {Slp liquid thickness:87764}  Daily Fluid Restriction: {CHP DME Yes amt example:482455809}  Last Modified Barium Swallow with Video (Video Swallowing Test): {Done Not Done BMTY:006324282}    Treatments at the Time of Hospital Discharge:   Respiratory Treatments: ***  Oxygen Therapy:  {Therapy; copd oxygen:86031}  Ventilator:    { CC Vent TLKK:882371870}    Rehab Therapies: {THERAPEUTIC INTERVENTION:7859090787}  Weight Bearing Status/Restrictions: [address] Weight Bearin}  Other Medical Equipment (for information only, NOT a DME order):  {EQUIPMENT:538722379}  Other Treatments: ***    Patient's personal belongings (please select all that are sent with patient):  {[facility] DME Belongings:974777714}    RN SIGNATURE:  {Esignature:471098763}    CASE MANAGEMENT/SOCIAL WORK SECTION    Inpatient Status Date: ***    Readmission Risk Assessment Score:  Readmission Risk

## 2023-05-08 NOTE — ANESTHESIA PRE PROCEDURE
NPO Status: Time of last liquid consumption: 0445                        Time of last solid consumption: 2100                        Date of last liquid consumption: 05/08/23                        Date of last solid food consumption: 05/07/23    BMI:   Wt Readings from Last 3 Encounters:   05/04/23 182 lb (82.6 kg)   04/25/23 206 lb (93.4 kg)   11/21/22 206 lb (93.4 kg)     Body mass index is 31.24 kg/m².     CBC:   Lab Results   Component Value Date/Time    WBC 1.2 11/14/2022 03:50 PM    RBC 3.94 11/14/2022 03:50 PM    HGB 9.4 11/14/2022 03:50 PM    HCT 31.7 11/14/2022 03:50 PM    MCV 80.5 11/14/2022 03:50 PM    RDW 22.3 11/14/2022 03:50 PM     11/14/2022 03:50 PM       CMP:   Lab Results   Component Value Date/Time     11/14/2022 03:50 PM    K 2.4 11/14/2022 03:50 PM     11/14/2022 03:50 PM    CO2 27 11/14/2022 03:50 PM    BUN 27 11/14/2022 03:50 PM    CREATININE 0.99 05/08/2023 06:54 AM    CREATININE 1.32 11/14/2022 03:50 PM    AGRATIO 0.5 11/14/2022 03:50 PM    GLUCOSE 126 11/14/2022 03:50 PM    PROT 8.2 11/14/2022 03:50 PM    CALCIUM 8.8 11/14/2022 03:50 PM    BILITOT 0.3 11/14/2022 03:50 PM    ALKPHOS 88 11/14/2022 03:50 PM    AST 15 11/14/2022 03:50 PM    ALT 26 11/14/2022 03:50 PM       POC Tests:   Recent Labs     05/08/23  0654   POCGLU 98   POCNA 140   POCK 4.3   POCCL 107       Coags: No results found for: PROTIME, INR, APTT    HCG (If Applicable):   Lab Results   Component Value Date    PREGTESTUR Negative 05/08/2023        ABGs: No results found for: PHART, PO2ART, ERH4NMS, XPF0KJH, BEART, I0SSOAIB     Type & Screen (If Applicable):  No results found for: LABABO, LABRH    Drug/Infectious Status (If Applicable):  No results found for: HIV, HEPCAB    COVID-19 Screening (If Applicable): No results found for: COVID19        Anesthesia Evaluation  Patient summary reviewed and Nursing notes reviewed history of anesthetic complications:   Airway: Mallampati: I  TM distance: <3 FB

## 2023-05-29 RX ORDER — CYPROHEPTADINE HYDROCHLORIDE 4 MG/1
TABLET ORAL 3 TIMES DAILY PRN
COMMUNITY

## 2023-05-29 RX ORDER — VALACYCLOVIR HYDROCHLORIDE 500 MG/1
1 TABLET, FILM COATED ORAL 2 TIMES DAILY
COMMUNITY
Start: 2022-08-29

## 2023-06-05 PROBLEM — C76.2 ABDOMINAL MALIGNANCY (HCC): Status: RESOLVED | Noted: 2021-09-29 | Resolved: 2023-01-01

## 2023-06-05 PROBLEM — C76.2 ABDOMINAL MALIGNANCY (HCC): Status: ACTIVE | Noted: 2021-09-29

## 2023-06-05 PROBLEM — C49.9 SARCOMA (HCC): Status: ACTIVE | Noted: 2021-09-29

## 2023-06-05 PROBLEM — N13.39 OTHER HYDRONEPHROSIS: Status: ACTIVE | Noted: 2021-09-27

## 2023-06-09 ENCOUNTER — TELEMEDICINE (OUTPATIENT)
Age: 43
End: 2023-06-09

## 2023-06-09 DIAGNOSIS — N13.39 OTHER HYDRONEPHROSIS: ICD-10-CM

## 2023-06-09 DIAGNOSIS — C49.9 SARCOMA (HCC): ICD-10-CM

## 2023-06-09 DIAGNOSIS — Z96.0 RETAINED URETERAL STENT: Primary | ICD-10-CM

## 2023-06-09 DIAGNOSIS — N28.89 RENAL MASS, RIGHT: ICD-10-CM

## 2023-06-09 NOTE — PROGRESS NOTES
Sal Asher, was evaluated through a synchronous (real-time) audio-video encounter. The patient (or guardian if applicable) is aware that this is a billable service, which includes applicable co-pays. This Virtual Visit was conducted with patient's (and/or legal guardian's) consent. The visit was conducted pursuant to the emergency declaration under the [facility], [address] [place] [address] authority and the [place] [employer] General Act. Patient identification was verified, and a caregiver was present when appropriate. The patient was located in a state where the provider was licensed to provide care. HISTORY OF PRESENT ILLNESS  Sla Asher is a 37 y.o. female. has a past medical history of Asthma, Cancer ([facility] CHF (congestive heart failure) ([address] High cholesterol, Hx of blood clots, and Primary intra-abdominal sarcoma ([facility] [address] has a past surgical history that includes Urological Surgery; Portacath placement; Hensel tooth extraction; Cystoscopy (Bilateral, 05/08/2023); Urological Surgery (05/08/2023); Urological Surgery (Bilateral, 05/08/2023); and Urological Surgery (Right, 05/08/2023). Chief Complaint   Patient presents with    Post-Op Check     Stent change      HPI  1. Retained ureteral stent  Overview:  She is status post cystoscopy, bilateral RPG, and right ureteral stent   placement since 9/30/2021. Assessment & Plan:  Her right ureteral stent was changed on 5/8/2023. She has history of sarcoma  2. Other hydronephrosis  Overview:  She has a tight proximal ureteral stricture due to sarcoma on the right side. She is managed with a ureteral stent. Assessment & Plan:   Proximal ureteral stricture due to sarcoma  Continue stent management  3. Renal mass, right  Overview:  CT 12/9/22 with a 2cm RUP renal tumor. Assessment & Plan:  Renal mass, small. We will keep an eye on this.   She will have a repeat scan at some point

## 2023-06-09 NOTE — ASSESSMENT & PLAN NOTE
Renal mass, small. We will keep an eye on this. She will have a repeat scan at some point after starting the new regimen for her sarcoma.

## 2023-06-09 NOTE — PROGRESS NOTES
Chief Complaint   Patient presents with    Post-Op Check     Stent change      1. Have you been to the ER, urgent care clinic since your last visit? Hospitalized since your last visit? No    2. Have you seen or consulted any other health care providers outside of the [address] [place] [address] since your last visit? Include any pap smears or colon screening.  No

## 2023-07-28 ENCOUNTER — APPOINTMENT (OUTPATIENT)
Facility: HOSPITAL | Age: 43
End: 2023-07-28
Payer: COMMERCIAL

## 2023-07-28 ENCOUNTER — HOSPITAL ENCOUNTER (EMERGENCY)
Facility: HOSPITAL | Age: 43
Discharge: ANOTHER HOSPITAL | End: 2023-07-29
Attending: EMERGENCY MEDICINE
Payer: COMMERCIAL

## 2023-07-28 DIAGNOSIS — J45.52 SEVERE PERSISTENT ASTHMA WITH STATUS ASTHMATICUS: ICD-10-CM

## 2023-07-28 DIAGNOSIS — J96.02 ACUTE RESPIRATORY FAILURE WITH HYPOXIA AND HYPERCAPNIA (HCC): Primary | ICD-10-CM

## 2023-07-28 DIAGNOSIS — J96.01 ACUTE RESPIRATORY FAILURE WITH HYPOXIA AND HYPERCAPNIA (HCC): Primary | ICD-10-CM

## 2023-07-28 DIAGNOSIS — D50.0 BLOOD LOSS ANEMIA: ICD-10-CM

## 2023-07-28 DIAGNOSIS — C76.2 CANCER OF ABDOMINAL ORGAN (HCC): ICD-10-CM

## 2023-07-28 LAB
ALBUMIN SERPL-MCNC: 1.3 G/DL (ref 3.5–5)
ALBUMIN/GLOB SERPL: 0.3 (ref 1.1–2.2)
ALP SERPL-CCNC: 102 U/L (ref 45–117)
ALT SERPL-CCNC: 7 U/L (ref 12–78)
ANION GAP SERPL CALC-SCNC: 8 MMOL/L (ref 5–15)
APPEARANCE UR: CLEAR
ARTERIAL PATENCY WRIST A: YES
AST SERPL W P-5'-P-CCNC: 18 U/L (ref 15–37)
BACTERIA URNS QL MICRO: ABNORMAL /HPF
BASE DEFICIT BLDA-SCNC: 11.4 MMOL/L
BASOPHILS # BLD: 0 K/UL (ref 0–0.2)
BASOPHILS NFR BLD: 0 % (ref 0–2.5)
BDY SITE: ABNORMAL
BILIRUB SERPL-MCNC: 0.9 MG/DL (ref 0.2–1)
BILIRUB UR QL: NEGATIVE
BNP SERPL-MCNC: 889 PG/ML
BUN SERPL-MCNC: 23 MG/DL (ref 6–20)
BUN/CREAT SERPL: 13 (ref 12–20)
CA-I BLD-MCNC: 7.7 MG/DL (ref 8.5–10.1)
CHLORIDE SERPL-SCNC: 98 MMOL/L (ref 97–108)
CO2 SERPL-SCNC: 22 MMOL/L (ref 21–32)
COHGB MFR BLD: 0.1 % (ref 1–2)
COLOR UR: ABNORMAL
CREAT SERPL-MCNC: 1.73 MG/DL (ref 0.55–1.02)
EKG ATRIAL RATE: 120 BPM
EKG DIAGNOSIS: NORMAL
EKG P AXIS: 53 DEGREES
EKG P-R INTERVAL: 122 MS
EKG Q-T INTERVAL: 299 MS
EKG QRS DURATION: 82 MS
EKG QTC CALCULATION (BAZETT): 423 MS
EKG R AXIS: -5 DEGREES
EKG T AXIS: 160 DEGREES
EKG VENTRICULAR RATE: 120 BPM
EOSINOPHIL # BLD: 0 K/UL (ref 0–0.7)
EOSINOPHIL NFR BLD: 0 % (ref 0.9–2.9)
ERYTHROCYTE [DISTWIDTH] IN BLOOD BY AUTOMATED COUNT: 18.8 % (ref 11.5–14.5)
GLOBULIN SER CALC-MCNC: 4.1 G/DL (ref 2–4)
GLUCOSE SERPL-MCNC: 144 MG/DL (ref 65–100)
GLUCOSE UR STRIP.AUTO-MCNC: NEGATIVE MG/DL
HCO3 BLDA-SCNC: 13 MMOL/L (ref 22–26)
HCT VFR BLD AUTO: 25 % (ref 36–46)
HGB BLD-MCNC: 7.5 G/DL (ref 13.5–17.5)
HGB UR QL STRIP: ABNORMAL
KETONES UR QL STRIP.AUTO: NEGATIVE MG/DL
LACTATE SERPL-SCNC: 4.4 MMOL/L (ref 0.4–2)
LACTATE SERPL-SCNC: 5.5 MMOL/L (ref 0.4–2)
LEUKOCYTE ESTERASE UR QL STRIP.AUTO: ABNORMAL
LYMPHOCYTES # BLD: 0.6 K/UL (ref 1–4.8)
LYMPHOCYTES NFR BLD: 3 % (ref 20.5–51.1)
MAGNESIUM SERPL-MCNC: 2 MG/DL (ref 1.6–2.4)
MCH RBC QN AUTO: 27.4 PG (ref 31–34)
MCHC RBC AUTO-ENTMCNC: 30.2 G/DL (ref 31–36)
MCV RBC AUTO: 90.8 FL (ref 80–100)
METHGB MFR BLD: 0.7 % (ref 0–1.4)
MONOCYTES # BLD: 0.1 K/UL (ref 0.2–2.4)
MONOCYTES NFR BLD: 1 % (ref 1.7–9.3)
NEUTS SEG # BLD: 16.4 K/UL (ref 1.8–7.7)
NEUTS SEG NFR BLD: 96 % (ref 42–75)
NITRITE UR QL STRIP.AUTO: NEGATIVE
NRBC # BLD: 0.01 K/UL
NRBC BLD-RTO: 0.1 PER 100 WBC
OXYHGB MFR BLD: 98.7 % (ref 95–99)
PCO2 BLDA: 26 MMHG (ref 35–45)
PERFORMED BY:: ABNORMAL
PH BLDA: 7.33 (ref 7.35–7.45)
PH UR STRIP: 6.5 (ref 5–8)
PLATELET # BLD AUTO: 171 K/UL (ref 150–400)
PMV BLD AUTO: 7.6 FL (ref 6.5–11.5)
PO2 BLDA: 457 MMHG (ref 80–100)
POTASSIUM SERPL-SCNC: 4.1 MMOL/L (ref 3.5–5.1)
PROT SERPL-MCNC: 5.4 G/DL (ref 6.4–8.2)
PROT UR STRIP-MCNC: 100 MG/DL
RBC # BLD AUTO: 2.75 M/UL (ref 4.5–5.9)
RBC #/AREA URNS HPF: ABNORMAL /HPF (ref 0–3)
SAO2% DEVICE SAO2% SENSOR NAME: ABNORMAL
SODIUM SERPL-SCNC: 128 MMOL/L (ref 136–145)
SP GR UR REFRACTOMETRY: 1.01 (ref 1–1.03)
SPECIMEN SITE: ABNORMAL
TROPONIN I SERPL HS-MCNC: 5 NG/L (ref 0–51)
URINE CULTURE IF INDICATED: ABNORMAL
UROBILINOGEN UR QL STRIP.AUTO: 0.2 EU/DL (ref 0.2–1)
WBC # BLD AUTO: 17.2 K/UL (ref 4.4–11.3)
WBC URNS QL MICRO: ABNORMAL /HPF (ref 0–5)

## 2023-07-28 PROCEDURE — 36415 COLL VENOUS BLD VENIPUNCTURE: CPT

## 2023-07-28 PROCEDURE — 83735 ASSAY OF MAGNESIUM: CPT

## 2023-07-28 PROCEDURE — 86850 RBC ANTIBODY SCREEN: CPT

## 2023-07-28 PROCEDURE — 96368 THER/DIAG CONCURRENT INF: CPT

## 2023-07-28 PROCEDURE — 86920 COMPATIBILITY TEST SPIN: CPT

## 2023-07-28 PROCEDURE — 71045 X-RAY EXAM CHEST 1 VIEW: CPT

## 2023-07-28 PROCEDURE — 36430 TRANSFUSION BLD/BLD COMPNT: CPT

## 2023-07-28 PROCEDURE — 96365 THER/PROPH/DIAG IV INF INIT: CPT

## 2023-07-28 PROCEDURE — 31500 INSERT EMERGENCY AIRWAY: CPT

## 2023-07-28 PROCEDURE — 93005 ELECTROCARDIOGRAM TRACING: CPT | Performed by: EMERGENCY MEDICINE

## 2023-07-28 PROCEDURE — 81001 URINALYSIS AUTO W/SCOPE: CPT

## 2023-07-28 PROCEDURE — 2580000003 HC RX 258: Performed by: EMERGENCY MEDICINE

## 2023-07-28 PROCEDURE — 83605 ASSAY OF LACTIC ACID: CPT

## 2023-07-28 PROCEDURE — 96366 THER/PROPH/DIAG IV INF ADDON: CPT

## 2023-07-28 PROCEDURE — P9016 RBC LEUKOCYTES REDUCED: HCPCS

## 2023-07-28 PROCEDURE — 82803 BLOOD GASES ANY COMBINATION: CPT

## 2023-07-28 PROCEDURE — 2500000003 HC RX 250 WO HCPCS: Performed by: EMERGENCY MEDICINE

## 2023-07-28 PROCEDURE — 36556 INSERT NON-TUNNEL CV CATH: CPT

## 2023-07-28 PROCEDURE — 6360000002 HC RX W HCPCS: Performed by: EMERGENCY MEDICINE

## 2023-07-28 PROCEDURE — 36600 WITHDRAWAL OF ARTERIAL BLOOD: CPT

## 2023-07-28 PROCEDURE — 99285 EMERGENCY DEPT VISIT HI MDM: CPT

## 2023-07-28 PROCEDURE — 84145 PROCALCITONIN (PCT): CPT

## 2023-07-28 PROCEDURE — 83880 ASSAY OF NATRIURETIC PEPTIDE: CPT

## 2023-07-28 PROCEDURE — 96376 TX/PRO/DX INJ SAME DRUG ADON: CPT

## 2023-07-28 PROCEDURE — 84484 ASSAY OF TROPONIN QUANT: CPT

## 2023-07-28 PROCEDURE — 96375 TX/PRO/DX INJ NEW DRUG ADDON: CPT

## 2023-07-28 PROCEDURE — 80053 COMPREHEN METABOLIC PANEL: CPT

## 2023-07-28 PROCEDURE — 86900 BLOOD TYPING SEROLOGIC ABO: CPT

## 2023-07-28 PROCEDURE — 85025 COMPLETE CBC W/AUTO DIFF WBC: CPT

## 2023-07-28 PROCEDURE — 86901 BLOOD TYPING SEROLOGIC RH(D): CPT

## 2023-07-28 PROCEDURE — 87040 BLOOD CULTURE FOR BACTERIA: CPT

## 2023-07-28 PROCEDURE — 86923 COMPATIBILITY TEST ELECTRIC: CPT

## 2023-07-28 RX ORDER — FENTANYL CITRATE-0.9 % NACL/PF 20 MCG/2ML
50 SYRINGE (ML) INTRAVENOUS EVERY 30 MIN PRN
Status: DISCONTINUED | OUTPATIENT
Start: 2023-07-28 | End: 2023-07-29 | Stop reason: HOSPADM

## 2023-07-28 RX ORDER — MIDAZOLAM HYDROCHLORIDE 2 MG/2ML
1 INJECTION, SOLUTION INTRAMUSCULAR; INTRAVENOUS EVERY 30 MIN PRN
Status: DISCONTINUED | OUTPATIENT
Start: 2023-07-28 | End: 2023-07-29 | Stop reason: HOSPADM

## 2023-07-28 RX ORDER — ROCURONIUM BROMIDE 10 MG/ML
1 INJECTION, SOLUTION INTRAVENOUS ONCE
Status: COMPLETED | OUTPATIENT
Start: 2023-07-28 | End: 2023-07-28

## 2023-07-28 RX ORDER — IPRATROPIUM BROMIDE AND ALBUTEROL SULFATE 2.5; .5 MG/3ML; MG/3ML
3 SOLUTION RESPIRATORY (INHALATION) ONCE
Status: DISCONTINUED | OUTPATIENT
Start: 2023-07-28 | End: 2023-07-29 | Stop reason: HOSPADM

## 2023-07-28 RX ORDER — NOREPINEPHRINE BITARTRATE 0.06 MG/ML
1-50 INJECTION, SOLUTION INTRAVENOUS CONTINUOUS
Status: DISCONTINUED | OUTPATIENT
Start: 2023-07-28 | End: 2023-07-29 | Stop reason: HOSPADM

## 2023-07-28 RX ORDER — VANCOMYCIN 2 G/400ML
25 INJECTION, SOLUTION INTRAVENOUS ONCE
Status: COMPLETED | OUTPATIENT
Start: 2023-07-28 | End: 2023-07-28

## 2023-07-28 RX ORDER — SODIUM CHLORIDE 9 MG/ML
INJECTION, SOLUTION INTRAVENOUS PRN
Status: DISCONTINUED | OUTPATIENT
Start: 2023-07-28 | End: 2023-07-29 | Stop reason: HOSPADM

## 2023-07-28 RX ORDER — 0.9 % SODIUM CHLORIDE 0.9 %
30 INTRAVENOUS SOLUTION INTRAVENOUS ONCE
Status: COMPLETED | OUTPATIENT
Start: 2023-07-28 | End: 2023-07-28

## 2023-07-28 RX ADMIN — HYDROCORTISONE SODIUM SUCCINATE 100 MG: 100 INJECTION, POWDER, FOR SOLUTION INTRAMUSCULAR; INTRAVENOUS at 23:33

## 2023-07-28 RX ADMIN — NOREPINEPHRINE BITARTRATE 30 MCG/MIN: 0.06 INJECTION, SOLUTION INTRAVENOUS at 16:57

## 2023-07-28 RX ADMIN — SODIUM CHLORIDE 2355 ML: 9 INJECTION, SOLUTION INTRAVENOUS at 18:01

## 2023-07-28 RX ADMIN — FENTANYL CITRATE 50 MCG/HR: 50 INJECTION, SOLUTION INTRAMUSCULAR; INTRAVENOUS at 18:02

## 2023-07-28 RX ADMIN — VANCOMYCIN 2000 MG: 2 INJECTION, SOLUTION INTRAVENOUS at 20:06

## 2023-07-28 RX ADMIN — MIDAZOLAM 2 MG/HR: 5 INJECTION INTRAMUSCULAR; INTRAVENOUS at 18:12

## 2023-07-28 RX ADMIN — CEFEPIME 2000 MG: 2 INJECTION, POWDER, FOR SOLUTION INTRAVENOUS at 19:44

## 2023-07-28 RX ADMIN — MIDAZOLAM 1 MG: 1 INJECTION INTRAMUSCULAR; INTRAVENOUS at 22:50

## 2023-07-28 RX ADMIN — ROCURONIUM BROMIDE 79 MG: 10 INJECTION, SOLUTION INTRAVENOUS at 18:00

## 2023-07-28 RX ADMIN — EPINEPHRINE 17 MCG/MIN: 1 INJECTION INTRAMUSCULAR; INTRAVENOUS; SUBCUTANEOUS at 23:42

## 2023-07-28 NOTE — ED NOTES
Call out to Transfer Center for STAT transfer to [facility] ED via air, awaiting call back at this time     Wendy Guzmán RN  07/28/23 0599

## 2023-07-28 NOTE — ED NOTES
1645- .5mg of epi administered due to poor bp readings  1650 Intubation set up complete  1650- 20mg etomidate administered  1650- 80mg of Succs given  1651-Intubation started  1652-intubation completed 23 @ the teeth  1651 0.5mg epi given    Yuval rescue  Harpreet Schmid RN Primary nurse  Natanael Olivarez RN  [facility] Supervisor  RT  [facility] MD Natanael Olivarez RN  07/28/23 500 [facility], RN  07/28/23 820 [facility], RN  07/28/23 6100

## 2023-07-28 NOTE — ED NOTES
Transfer center called to advise pt accepted for transfer to [address] [place] [address]. Awaiting ICU bed to be clean and available before transport can be arranged.  Accepting physician Dr. Inder Ferreira MD.     Janeen Stern RN  07/28/23 2661 1 = Total assistance

## 2023-07-28 NOTE — ED TRIAGE NOTES
PT arrived via EMS in respiratory distress and on CPAP. Per EMS pt sats in 50s on RA. Unable to obtain BP in triage. MD at bedside upon pt arrival. Per EMS pt has stage 4 abd cancer and is currently receiving treatments. Port accessed due to poor peripheral access. Md to intubate.

## 2023-07-29 VITALS
OXYGEN SATURATION: 100 % | TEMPERATURE: 101.2 F | RESPIRATION RATE: 24 BRPM | DIASTOLIC BLOOD PRESSURE: 72 MMHG | SYSTOLIC BLOOD PRESSURE: 89 MMHG | HEART RATE: 124 BPM | BODY MASS INDEX: 29.7 KG/M2 | WEIGHT: 173 LBS

## 2023-07-29 LAB
ARTERIAL PATENCY WRIST A: YES
BASE DEFICIT BLDA-SCNC: 11.5 MMOL/L
BDY SITE: ABNORMAL
BREATHS.SPONTANEOUS ON VENT: 24
COHGB MFR BLD: 0.1 % (ref 1–2)
FIO2 ON VENT: 45 %
GAS FLOW.O2 SETTING OXYMISER: 24
HCO3 BLDA-SCNC: 12 MMOL/L (ref 22–26)
LACTATE SERPL-SCNC: 4.7 MMOL/L (ref 0.4–2)
METHGB MFR BLD: 0.1 % (ref 0–1.4)
OXYHGB MFR BLD: 98.9 % (ref 95–99)
PCO2 BLDA: 23 MMHG (ref 35–45)
PEEP RESPIRATORY: 5
PERFORMED BY:: ABNORMAL
PH BLDA: 7.35 (ref 7.35–7.45)
PO2 BLDA: 153 MMHG (ref 80–100)
PROCALCITONIN SERPL-MCNC: 44.85 NG/ML
SAO2% DEVICE SAO2% SENSOR NAME: ABNORMAL
SPECIMEN SITE: ABNORMAL
TOTAL RATE: 24
VENTILATION MODE VENT: ABNORMAL
VT SETTING VENT: 520

## 2023-07-29 PROCEDURE — 36600 WITHDRAWAL OF ARTERIAL BLOOD: CPT

## 2023-07-29 PROCEDURE — 82803 BLOOD GASES ANY COMBINATION: CPT

## 2023-07-29 NOTE — ED NOTES
Patient report and transfer paperwork given to Eh Malagon with [facility] unit. Patient is being transferred at this time. Family is aware of the transfer, they deny any questions.       Serenity Braden RN  07/29/23 1669

## 2023-07-29 NOTE — ED NOTES
Patient administered one unit PRBC emergently due to hypotension, hgb 7.5 and active GI bleed.      Unit information:   [id number]  Rh Negative  Exp: August 06, 2023  Verifying RN: Oleg Allison RN  07/29/23 2058

## 2023-07-29 NOTE — ED PROVIDER NOTES
Expectation of Test or Treatment.): See MDM    Patient was given the following medications:  Medications   EPINEPHrine 5 mg in sodium chloride 0.9 % 250 mL infusion (12 mcg/min IntraVENous Rate/Dose Change 7/28/23 1818)   midazolam (VERSED) 100 mg in sodium chloride 0.9 % 100 mL infusion (1 mg/hr IntraVENous Rate/Dose Change 7/28/23 1951)   midazolam PF (VERSED) injection 1 mg (1 mg IntraVENous Given 7/28/23 2250)   fentaNYL (SUBLIMAZE) 1,000 mcg in sodium chloride 0.9 % 100 mL infusion (50 mcg/hr IntraVENous New Bag 7/28/23 1802)     And   fentaNYL (SUBLIMAZE) bolus from bag (has no administration in time range)   ipratropium 0.5 mg-albuterol 2.5 mg (DUONEB) nebulizer solution 3 Dose (has no administration in time range)   norepinephrine (LEVOPHED) 16 mg in sodium chloride 0.9 % 250 mL infusion (30 mcg/min IntraVENous New Bag 7/28/23 1657)   0.9 % sodium chloride infusion (has no administration in time range)   hydrocortisone sodium succinate PF (SOLU-CORTEF) injection 100 mg (has no administration in time range)   0.9 % sodium chloride IV bolus 2,355 mL (0 mLs IntraVENous Stopped 7/28/23 2006)   ceFEPIme (MAXIPIME) 2,000 mg in sodium chloride 0.9 % 50 mL IVPB (mini-bag) (0 mg IntraVENous Stopped 7/28/23 2059)   vancomycin (VANCOCIN) 2000 mg in 400 mL IVPB (0 mg IntraVENous Stopped 7/28/23 2208)   rocuronium (ZEMURON) injection 79 mg (79 mg IntraVENous Given 7/28/23 1800)       CONSULTS: (Who and What was discussed)  None      PROCEDURES   Unless otherwise noted above, none  Intubation    Date/Time: 7/28/2023 11:10 PM  Performed by: Paris Zhang MD  Authorized by: Paris Zhang MD     Consent:     Consent obtained:  Emergent situation  Pre-procedure details:     Indication: failure to oxygenate, failure to protect airway, failure to ventilate and predicted clinical deterioration      Patient status:  Unresponsive    Look externally: no concerns      Mouth opening - incisor distance:  2 finger widths    Hyoid-mental distance: 2 finger widths      Hyoid-thyroid distance: 2 or more finger widths      Mallampati score:  IV    Obstruction: none      Neck mobility: reduced      Pharmacologic strategy: DSI      Induction agents:  Etomidate    Paralytics:  Succinylcholine  Procedure details:     Preoxygenation:  Bag valve mask    CPR in progress: no      Intubation method:  Oral    Intubation technique: video assisted      Laryngoscope blade: Mac 4    Bougie used: no      Grade view: I      Tube size (mm):  7.5    Tube type:  Cuffed    Number of attempts:  1    Ventilation between attempts: no      Tube visualized through cords: yes    Placement assessment:     ETT at teeth/gumline (cm):  23    Tube secured with:  ETT yancey    Breath sounds:  Equal and absent over the epigastrium    Placement verification: chest rise, numeric ETCO2 and waveform ETCO2      CXR findings:  Mainstem    Tube repositioned: yes    Post-procedure details:     Procedure completion:  Tolerated well, no immediate complications    Complications comment:  Hypotension before and after intubation  Central Line    Date/Time: 7/28/2023 11:12 PM  Performed by: Parth Knutson MD  Authorized by: Parth Knutson MD     Consent:     Consent obtained:  Emergent situation  Pre-procedure details:     Indication(s): central venous access and insufficient peripheral access      Hand hygiene: Hand hygiene performed prior to insertion      Sterile barrier technique:  All elements of maximal sterile technique followed      Skin preparation:  Chlorhexidine    Skin preparation agent: Skin preparation agent completely dried prior to procedure    Sedation:     Sedation type:  Deep  Anesthesia:     Anesthesia method:  None  Procedure details:     Location:  R femoral    Site selection rationale:  Expedience    Patient position:  Supine    Procedural supplies:  Triple lumen    Catheter size:  10 Fr    Landmarks identified: yes      Ultrasound

## 2023-07-29 NOTE — ED NOTES
Patient will nod to answer questions, level of sedation appears appropriate. Patient denies discomfort. Provider aware of patient's fever. Patient assessed for possible hemolytic reaction- breath sounds clear and equal bilaterally. No hives/rash/new-onset edema apparent. Springer emptied, output of 300mL. Family member at bedside.       Annetta Shell, DAY  07/29/23 [id number] [facility], RN  07/29/23 8095

## 2023-07-29 NOTE — CONSENT
Informed Consent for Blood Component Transfusion Note    I have discussed with the mother the rationale for blood component transfusion; its benefits in treating or preventing fatigue, organ damage, or death; and its risk which includes mild transfusion reactions, rare risk of blood borne infection, or more serious but rare reactions. I have discussed the alternatives to transfusion, including the risk and consequences of not receiving transfusion. The mother had an opportunity to ask questions and had agreed to proceed with transfusion of blood components.     Electronically signed by Juan Carlos Sutherland MD on 7/28/23 at 7:40 PM EDT

## 2023-07-30 LAB
ABO + RH BLD: NORMAL
BACTERIA SPEC CULT: NORMAL
BLD PROD TYP BPU: NORMAL
BLD PROD TYP BPU: NORMAL
BLOOD BANK DISPENSE STATUS: NORMAL
BLOOD BANK DISPENSE STATUS: NORMAL
BLOOD GROUP ANTIBODIES SERPL: NEGATIVE
BPU ID: NORMAL
BPU ID: NORMAL
CROSSMATCH RESULT: NORMAL
CROSSMATCH RESULT: NORMAL
Lab: NORMAL
SPECIMEN EXP DATE BLD: NORMAL
TRANSFUSION STATUS PATIENT QL: NORMAL
TRANSFUSION STATUS PATIENT QL: NORMAL
UNIT DIVISION: 0
UNIT DIVISION: 0

## 2023-07-31 LAB
EKG ATRIAL RATE: 120 BPM
EKG DIAGNOSIS: NORMAL
EKG P AXIS: 53 DEGREES
EKG P-R INTERVAL: 122 MS
EKG Q-T INTERVAL: 299 MS
EKG QRS DURATION: 82 MS
EKG QTC CALCULATION (BAZETT): 423 MS
EKG R AXIS: -5 DEGREES
EKG T AXIS: 160 DEGREES
EKG VENTRICULAR RATE: 120 BPM

## 2023-08-03 LAB
BACTERIA SPEC CULT: NORMAL
Lab: NORMAL

## (undated) DEVICE — SEAL INSTR PRT SELF SEAL

## (undated) DEVICE — CYSTO PACK: Brand: MEDLINE INDUSTRIES, INC.

## (undated) DEVICE — PREP PAD BNS: Brand: CONVERTORS

## (undated) DEVICE — SOLUTION IRRIG 3000ML STRL H2O USP UROMATIC PLAS CONT

## (undated) DEVICE — GARMENT,MEDLINE,DVT,INT,CALF,MED, GEN2: Brand: MEDLINE

## (undated) DEVICE — SOLUTION SCRB 4OZ 4% CHG H2O AIDED FOR PREOPERATIVE SKIN

## (undated) DEVICE — SOLUTION IRRIG 500ML STRL H2O NONPYROGENIC

## (undated) DEVICE — VINYL ACETATE UROLOGICAL DRAINAGE BAG FOR GE/OEC SYSTEMS W/ 8MM PLUG - NON-STERILE: Brand: CUSTOM MEDICAL SPECIALTIES, INC.

## (undated) DEVICE — GDWIRE UROL STR 150CM FLX TP -- BX/5 SENSOR

## (undated) DEVICE — Device: Brand: SINGLE USE GRASPING FORCEPS FG-253SX

## (undated) DEVICE — GOWN,PREVENTION PLUS,XLN/XL,ST,24/CS: Brand: MEDLINE

## (undated) DEVICE — CATHETER ETER URET 5FR L70CM 0038IN FLX OPN TIP NO SIDE EYE

## (undated) DEVICE — BAG,DRAINAGE,ANTI-REFLUX TOWER,2000ML: Brand: MEDLINE

## (undated) DEVICE — SPONGE GZ W4XL4IN COT 12 PLY TYP VII WVN C FLD DSGN

## (undated) DEVICE — DRAPE EQUIP C ARM 74X42 IN MOB XR W/ TIE RUBBER BND LF

## (undated) DEVICE — GLOVE SURG SZ 75 L12IN FNGR THK79MIL GRN LTX FREE

## (undated) DEVICE — TUBING, SUCTION, 1/4" X 12', STRAIGHT: Brand: MEDLINE